# Patient Record
Sex: MALE | Race: WHITE | NOT HISPANIC OR LATINO
[De-identification: names, ages, dates, MRNs, and addresses within clinical notes are randomized per-mention and may not be internally consistent; named-entity substitution may affect disease eponyms.]

---

## 2017-03-26 ENCOUNTER — RECORDS - HEALTHEAST (OUTPATIENT)
Dept: ADMINISTRATIVE | Facility: OTHER | Age: 42
End: 2017-03-26

## 2017-03-27 ENCOUNTER — RECORDS - HEALTHEAST (OUTPATIENT)
Dept: ADMINISTRATIVE | Facility: OTHER | Age: 42
End: 2017-03-27

## 2017-04-19 ENCOUNTER — COMMUNICATION - HEALTHEAST (OUTPATIENT)
Dept: FAMILY MEDICINE | Facility: CLINIC | Age: 42
End: 2017-04-19

## 2017-04-19 DIAGNOSIS — G47.00 INSOMNIA: ICD-10-CM

## 2017-04-21 ENCOUNTER — COMMUNICATION - HEALTHEAST (OUTPATIENT)
Dept: FAMILY MEDICINE | Facility: CLINIC | Age: 42
End: 2017-04-21

## 2017-04-21 DIAGNOSIS — G47.00 INSOMNIA: ICD-10-CM

## 2017-07-17 ENCOUNTER — COMMUNICATION - HEALTHEAST (OUTPATIENT)
Dept: FAMILY MEDICINE | Facility: CLINIC | Age: 42
End: 2017-07-17

## 2017-07-17 DIAGNOSIS — F41.1 OTHER ANXIETY STATES: ICD-10-CM

## 2017-07-19 ENCOUNTER — COMMUNICATION - HEALTHEAST (OUTPATIENT)
Dept: FAMILY MEDICINE | Facility: CLINIC | Age: 42
End: 2017-07-19

## 2017-07-19 DIAGNOSIS — F41.1 OTHER ANXIETY STATES: ICD-10-CM

## 2017-11-07 ENCOUNTER — COMMUNICATION - HEALTHEAST (OUTPATIENT)
Dept: FAMILY MEDICINE | Facility: CLINIC | Age: 42
End: 2017-11-07

## 2017-11-07 DIAGNOSIS — G47.00 INSOMNIA: ICD-10-CM

## 2017-11-07 DIAGNOSIS — F41.9 ANXIETY: ICD-10-CM

## 2018-02-18 ENCOUNTER — COMMUNICATION - HEALTHEAST (OUTPATIENT)
Dept: FAMILY MEDICINE | Facility: CLINIC | Age: 43
End: 2018-02-18

## 2018-02-18 DIAGNOSIS — G47.00 INSOMNIA: ICD-10-CM

## 2018-02-18 DIAGNOSIS — F41.9 ANXIETY: ICD-10-CM

## 2018-05-21 ENCOUNTER — COMMUNICATION - HEALTHEAST (OUTPATIENT)
Dept: FAMILY MEDICINE | Facility: CLINIC | Age: 43
End: 2018-05-21

## 2018-05-21 DIAGNOSIS — F41.9 ANXIETY: ICD-10-CM

## 2018-06-21 ENCOUNTER — COMMUNICATION - HEALTHEAST (OUTPATIENT)
Dept: FAMILY MEDICINE | Facility: CLINIC | Age: 43
End: 2018-06-21

## 2018-06-21 DIAGNOSIS — F41.9 ANXIETY: ICD-10-CM

## 2018-07-06 ENCOUNTER — OFFICE VISIT - HEALTHEAST (OUTPATIENT)
Dept: FAMILY MEDICINE | Facility: CLINIC | Age: 43
End: 2018-07-06

## 2018-07-06 ENCOUNTER — COMMUNICATION - HEALTHEAST (OUTPATIENT)
Dept: TELEHEALTH | Facility: CLINIC | Age: 43
End: 2018-07-06

## 2018-07-06 DIAGNOSIS — G47.00 INSOMNIA: ICD-10-CM

## 2018-07-06 DIAGNOSIS — F41.9 ANXIETY: ICD-10-CM

## 2019-03-29 ENCOUNTER — OFFICE VISIT - HEALTHEAST (OUTPATIENT)
Dept: FAMILY MEDICINE | Facility: CLINIC | Age: 44
End: 2019-03-29

## 2019-03-29 ENCOUNTER — COMMUNICATION - HEALTHEAST (OUTPATIENT)
Dept: TELEHEALTH | Facility: CLINIC | Age: 44
End: 2019-03-29

## 2019-03-29 DIAGNOSIS — J02.9 SORE THROAT: ICD-10-CM

## 2019-03-29 DIAGNOSIS — J40 BRONCHITIS: ICD-10-CM

## 2019-03-29 DIAGNOSIS — R19.7 VOMITING AND DIARRHEA: ICD-10-CM

## 2019-03-29 DIAGNOSIS — H10.33 ACUTE BACTERIAL CONJUNCTIVITIS OF BOTH EYES: ICD-10-CM

## 2019-03-29 DIAGNOSIS — R52 GENERALIZED BODY ACHES: ICD-10-CM

## 2019-03-29 DIAGNOSIS — R11.10 VOMITING AND DIARRHEA: ICD-10-CM

## 2019-03-29 LAB
DEPRECATED S PYO AG THROAT QL EIA: NORMAL
FLUAV AG SPEC QL IA: NORMAL
FLUBV AG SPEC QL IA: NORMAL

## 2019-03-29 ASSESSMENT — MIFFLIN-ST. JEOR: SCORE: 2043.85

## 2019-03-30 LAB — GROUP A STREP BY PCR: NORMAL

## 2019-07-24 ENCOUNTER — COMMUNICATION - HEALTHEAST (OUTPATIENT)
Dept: FAMILY MEDICINE | Facility: CLINIC | Age: 44
End: 2019-07-24

## 2019-07-24 DIAGNOSIS — F41.9 ANXIETY: ICD-10-CM

## 2019-08-14 ENCOUNTER — COMMUNICATION - HEALTHEAST (OUTPATIENT)
Dept: FAMILY MEDICINE | Facility: CLINIC | Age: 44
End: 2019-08-14

## 2019-08-14 DIAGNOSIS — F41.9 ANXIETY: ICD-10-CM

## 2019-11-11 ENCOUNTER — OFFICE VISIT - HEALTHEAST (OUTPATIENT)
Dept: FAMILY MEDICINE | Facility: CLINIC | Age: 44
End: 2019-11-11

## 2019-11-11 DIAGNOSIS — J18.9 WALKING PNEUMONIA: ICD-10-CM

## 2019-11-11 DIAGNOSIS — J98.01 BRONCHOSPASM: ICD-10-CM

## 2019-11-16 ENCOUNTER — COMMUNICATION - HEALTHEAST (OUTPATIENT)
Dept: FAMILY MEDICINE | Facility: CLINIC | Age: 44
End: 2019-11-16

## 2019-11-16 DIAGNOSIS — F41.9 ANXIETY: ICD-10-CM

## 2019-11-17 ENCOUNTER — COMMUNICATION - HEALTHEAST (OUTPATIENT)
Dept: FAMILY MEDICINE | Facility: CLINIC | Age: 44
End: 2019-11-17

## 2019-11-17 DIAGNOSIS — G47.00 INSOMNIA: ICD-10-CM

## 2019-12-02 ENCOUNTER — OFFICE VISIT - HEALTHEAST (OUTPATIENT)
Dept: FAMILY MEDICINE | Facility: CLINIC | Age: 44
End: 2019-12-02

## 2019-12-02 DIAGNOSIS — G47.00 INSOMNIA, UNSPECIFIED TYPE: ICD-10-CM

## 2019-12-02 DIAGNOSIS — Z13.220 LIPID SCREENING: ICD-10-CM

## 2019-12-02 DIAGNOSIS — G47.00 INSOMNIA: ICD-10-CM

## 2019-12-02 DIAGNOSIS — R05.9 COUGH: ICD-10-CM

## 2019-12-02 DIAGNOSIS — M40.00 KYPHOSIS (ACQUIRED) (POSTURAL): ICD-10-CM

## 2019-12-02 DIAGNOSIS — F41.9 ANXIETY: ICD-10-CM

## 2019-12-02 DIAGNOSIS — R51.9 NONINTRACTABLE HEADACHE, UNSPECIFIED CHRONICITY PATTERN, UNSPECIFIED HEADACHE TYPE: ICD-10-CM

## 2019-12-02 DIAGNOSIS — K62.5 BRBPR (BRIGHT RED BLOOD PER RECTUM): ICD-10-CM

## 2019-12-02 DIAGNOSIS — Z00.00 WELL ADULT EXAM: ICD-10-CM

## 2019-12-02 LAB
ERYTHROCYTE [DISTWIDTH] IN BLOOD BY AUTOMATED COUNT: 12 % (ref 11–14.5)
HCT VFR BLD AUTO: 47 % (ref 40–54)
HGB BLD-MCNC: 16 G/DL (ref 14–18)
MCH RBC QN AUTO: 32.1 PG (ref 27–34)
MCHC RBC AUTO-ENTMCNC: 34.1 G/DL (ref 32–36)
MCV RBC AUTO: 94 FL (ref 80–100)
PLATELET # BLD AUTO: 250 THOU/UL (ref 140–440)
PMV BLD AUTO: 7.2 FL (ref 7–10)
RBC # BLD AUTO: 4.99 MILL/UL (ref 4.4–6.2)
WBC: 7.5 THOU/UL (ref 4–11)

## 2019-12-02 ASSESSMENT — MIFFLIN-ST. JEOR: SCORE: 2021.73

## 2019-12-03 LAB
ALBUMIN SERPL-MCNC: 4.2 G/DL (ref 3.5–5)
ALP SERPL-CCNC: 99 U/L (ref 45–120)
ALT SERPL W P-5'-P-CCNC: 53 U/L (ref 0–45)
ANION GAP SERPL CALCULATED.3IONS-SCNC: 8 MMOL/L (ref 5–18)
AST SERPL W P-5'-P-CCNC: 35 U/L (ref 0–40)
BILIRUB DIRECT SERPL-MCNC: 0.2 MG/DL
BILIRUB SERPL-MCNC: 0.8 MG/DL (ref 0–1)
BUN SERPL-MCNC: 10 MG/DL (ref 8–22)
CALCIUM SERPL-MCNC: 9.6 MG/DL (ref 8.5–10.5)
CHLORIDE BLD-SCNC: 105 MMOL/L (ref 98–107)
CHOLEST SERPL-MCNC: 274 MG/DL
CO2 SERPL-SCNC: 24 MMOL/L (ref 22–31)
CREAT SERPL-MCNC: 0.94 MG/DL (ref 0.7–1.3)
FASTING STATUS PATIENT QL REPORTED: YES
GFR SERPL CREATININE-BSD FRML MDRD: >60 ML/MIN/1.73M2
GLUCOSE BLD-MCNC: 86 MG/DL (ref 70–125)
HDLC SERPL-MCNC: 39 MG/DL
LDLC SERPL CALC-MCNC: 188 MG/DL
POTASSIUM BLD-SCNC: 4 MMOL/L (ref 3.5–5)
PROT SERPL-MCNC: 7.5 G/DL (ref 6–8)
SODIUM SERPL-SCNC: 137 MMOL/L (ref 136–145)
TRIGL SERPL-MCNC: 234 MG/DL

## 2019-12-04 ENCOUNTER — COMMUNICATION - HEALTHEAST (OUTPATIENT)
Dept: FAMILY MEDICINE | Facility: CLINIC | Age: 44
End: 2019-12-04

## 2020-02-07 ENCOUNTER — RECORDS - HEALTHEAST (OUTPATIENT)
Dept: ADMINISTRATIVE | Facility: OTHER | Age: 45
End: 2020-02-07

## 2020-02-12 ENCOUNTER — AMBULATORY - HEALTHEAST (OUTPATIENT)
Dept: NURSING | Facility: CLINIC | Age: 45
End: 2020-02-12

## 2020-02-12 DIAGNOSIS — Z00.00 HEALTHCARE MAINTENANCE: ICD-10-CM

## 2020-02-27 ENCOUNTER — COMMUNICATION - HEALTHEAST (OUTPATIENT)
Dept: FAMILY MEDICINE | Facility: CLINIC | Age: 45
End: 2020-02-27

## 2020-02-27 DIAGNOSIS — F41.9 ANXIETY: ICD-10-CM

## 2020-05-27 ENCOUNTER — COMMUNICATION - HEALTHEAST (OUTPATIENT)
Dept: FAMILY MEDICINE | Facility: CLINIC | Age: 45
End: 2020-05-27

## 2020-05-27 DIAGNOSIS — G47.00 INSOMNIA: ICD-10-CM

## 2020-08-18 ENCOUNTER — COMMUNICATION - HEALTHEAST (OUTPATIENT)
Dept: FAMILY MEDICINE | Facility: CLINIC | Age: 45
End: 2020-08-18

## 2020-08-18 DIAGNOSIS — G47.00 INSOMNIA: ICD-10-CM

## 2020-08-18 DIAGNOSIS — F41.9 ANXIETY: ICD-10-CM

## 2020-11-16 ENCOUNTER — COMMUNICATION - HEALTHEAST (OUTPATIENT)
Dept: FAMILY MEDICINE | Facility: CLINIC | Age: 45
End: 2020-11-16

## 2020-11-16 DIAGNOSIS — G47.00 INSOMNIA: ICD-10-CM

## 2020-11-16 DIAGNOSIS — F41.9 ANXIETY: ICD-10-CM

## 2021-02-16 ENCOUNTER — COMMUNICATION - HEALTHEAST (OUTPATIENT)
Dept: FAMILY MEDICINE | Facility: CLINIC | Age: 46
End: 2021-02-16

## 2021-02-16 ENCOUNTER — AMBULATORY - HEALTHEAST (OUTPATIENT)
Dept: NURSING | Facility: CLINIC | Age: 46
End: 2021-02-16

## 2021-02-16 DIAGNOSIS — F41.9 ANXIETY: ICD-10-CM

## 2021-02-16 DIAGNOSIS — G47.00 INSOMNIA: ICD-10-CM

## 2021-02-16 DIAGNOSIS — Z23 NEED FOR INFLUENZA VACCINATION: ICD-10-CM

## 2021-03-23 ENCOUNTER — COMMUNICATION - HEALTHEAST (OUTPATIENT)
Dept: FAMILY MEDICINE | Facility: CLINIC | Age: 46
End: 2021-03-23

## 2021-03-23 DIAGNOSIS — G47.00 INSOMNIA: ICD-10-CM

## 2021-03-23 DIAGNOSIS — F41.9 ANXIETY: ICD-10-CM

## 2021-04-02 ENCOUNTER — OFFICE VISIT - HEALTHEAST (OUTPATIENT)
Dept: FAMILY MEDICINE | Facility: CLINIC | Age: 46
End: 2021-04-02

## 2021-04-02 DIAGNOSIS — Z00.00 ANNUAL PHYSICAL EXAM: ICD-10-CM

## 2021-04-02 DIAGNOSIS — Z00.00 HEALTHCARE MAINTENANCE: ICD-10-CM

## 2021-04-02 DIAGNOSIS — F41.9 ANXIETY: ICD-10-CM

## 2021-04-02 DIAGNOSIS — E78.2 MIXED HYPERLIPIDEMIA: ICD-10-CM

## 2021-04-02 DIAGNOSIS — Z13.1 SCREENING FOR DIABETES MELLITUS: ICD-10-CM

## 2021-04-02 DIAGNOSIS — G47.00 INSOMNIA: ICD-10-CM

## 2021-04-02 LAB
CHOLEST SERPL-MCNC: 212 MG/DL
FASTING STATUS PATIENT QL REPORTED: NO
FASTING STATUS PATIENT QL REPORTED: NO
GLUCOSE BLD-MCNC: 77 MG/DL (ref 74–125)
HDLC SERPL-MCNC: 33 MG/DL
LDLC SERPL CALC-MCNC: 156 MG/DL
TRIGL SERPL-MCNC: 115 MG/DL

## 2021-04-02 RX ORDER — TRAZODONE HYDROCHLORIDE 50 MG/1
TABLET, FILM COATED ORAL
Qty: 180 TABLET | Refills: 3 | Status: SHIPPED | OUTPATIENT
Start: 2021-04-02 | End: 2022-03-07

## 2021-04-02 RX ORDER — ESCITALOPRAM OXALATE 5 MG/1
5 TABLET ORAL DAILY
Qty: 90 TABLET | Refills: 3 | Status: SHIPPED | OUTPATIENT
Start: 2021-04-02 | End: 2022-04-20

## 2021-04-02 ASSESSMENT — ANXIETY QUESTIONNAIRES
5. BEING SO RESTLESS THAT IT IS HARD TO SIT STILL: SEVERAL DAYS
3. WORRYING TOO MUCH ABOUT DIFFERENT THINGS: SEVERAL DAYS
6. BECOMING EASILY ANNOYED OR IRRITABLE: SEVERAL DAYS
7. FEELING AFRAID AS IF SOMETHING AWFUL MIGHT HAPPEN: NOT AT ALL
2. NOT BEING ABLE TO STOP OR CONTROL WORRYING: NOT AT ALL
IF YOU CHECKED OFF ANY PROBLEMS ON THIS QUESTIONNAIRE, HOW DIFFICULT HAVE THESE PROBLEMS MADE IT FOR YOU TO DO YOUR WORK, TAKE CARE OF THINGS AT HOME, OR GET ALONG WITH OTHER PEOPLE: NOT DIFFICULT AT ALL
1. FEELING NERVOUS, ANXIOUS, OR ON EDGE: SEVERAL DAYS
4. TROUBLE RELAXING: SEVERAL DAYS
GAD7 TOTAL SCORE: 5

## 2021-04-02 ASSESSMENT — PATIENT HEALTH QUESTIONNAIRE - PHQ9: SUM OF ALL RESPONSES TO PHQ QUESTIONS 1-9: 4

## 2021-04-02 ASSESSMENT — MIFFLIN-ST. JEOR: SCORE: 2008.69

## 2021-04-04 ENCOUNTER — COMMUNICATION - HEALTHEAST (OUTPATIENT)
Dept: FAMILY MEDICINE | Facility: CLINIC | Age: 46
End: 2021-04-04

## 2021-04-04 LAB
ABO/RH(D): NORMAL
ABORH REPEAT: NORMAL

## 2021-04-27 ENCOUNTER — COMMUNICATION - HEALTHEAST (OUTPATIENT)
Dept: FAMILY MEDICINE | Facility: CLINIC | Age: 46
End: 2021-04-27

## 2021-04-27 DIAGNOSIS — G47.00 INSOMNIA: ICD-10-CM

## 2021-04-27 DIAGNOSIS — F41.9 ANXIETY: ICD-10-CM

## 2021-05-27 ASSESSMENT — PATIENT HEALTH QUESTIONNAIRE - PHQ9: SUM OF ALL RESPONSES TO PHQ QUESTIONS 1-9: 4

## 2021-05-27 NOTE — PROGRESS NOTES
Chief Complaint   Patient presents with     Cough     Symptoms started 3 weeks ago.     Sore Throat     Eye Color Change     Eyes got red since about Tuesday.      Emesis     This happened last week.     Diarrhea     Fever     HPI: Patient presents today with 3 weeks of illness, primarily chills, fever, body aches, sore throat, and a productive cough bringing up thick brown mucus.  2 weeks ago he also had vomiting and diarrhea, but that resolved spontaneously.  No abdominal pain now.  His chest aches with the cough and notes a little bit of tenderness along his tonsillar and submandibular lymph nodes.  He had a 5-year-old at home with similar symptoms who was able to recover spontaneously much quicker than this.  He notes a little bit of sinus congestion that is typically worse in the mornings.  He also notes that a few days ago he started to get goopy discharge and matting in his right eye that is now also transferred over to his left eye.  No loss of vision, but it does feel a little scratchy.  Atraumatic.    ROS:Review of Systems - History obtained from the patient  General ROS: positive for  - chills, fatigue and fever  Ophthalmic ROS: positive for -matting  ENT ROS: positive for - hearing change and sore throat  Allergy and Immunology ROS: positive for - itchy/watery eyes, nasal congestion and postnasal drip  Hematological and Lymphatic ROS: positive for - swollen lymph nodes  Respiratory ROS: positive for - cough  Cardiovascular ROS: negative  Musculoskeletal ROS: positive for - joint pain and joint stiffness    SH: The Patient's  reports that he quit smoking about 15 years ago. He has quit using smokeless tobacco.      FH: The Patient's family history includes Arthritis in his mother; Carotid Endarterectomy in his mother; Heart attack in his mother.     Meds:    Current Outpatient Medications on File Prior to Visit   Medication Sig Dispense Refill     citalopram (CELEXA) 20 MG tablet Take 1 tablet (20 mg  total) by mouth daily. 90 tablet 3     ranitidine (ZANTAC) 150 MG capsule Take 150 mg by mouth.       traZODone (DESYREL) 50 MG tablet Take 1-3 tablets ( mg total) by mouth at bedtime. 270 tablet 2     No current facility-administered medications on file prior to visit.        O:  /76   Pulse (!) 53   Temp 98  F (36.7  C) (Oral)   Ht 6' (1.829 m)   Wt (!) 246 lb (111.6 kg)   SpO2 97%   BMI 33.36 kg/m      Physical Examination:   General appearance - alert, well appearing, and in no distress  Mental status - alert, oriented to person, place, and time  Eyes -conjunctiva injected.  No overt thick drainage noted today.  Pupils equal and reactive, extraocular eye movements intact  Ears - bilateral TM's and external ear canals normal  Nose - normal and patent, no erythema, discharge or polyps  Mouth - mucous membranes moist, pharynx normal without lesions  Neck - supple, no significant adenopathy  Lymphatics - no palpable lymphadenopathy, no hepatosplenomegaly  Chest - clear to auscultation, no wheezes, rales or rhonchi, symmetric air entry  Heart - normal rate and regular rhythm, S1 and S2 normal, no murmurs noted  Extremities - peripheral pulses normal, no pedal edema, no clubbing or cyanosis  Skin - normal coloration and turgor, no rashes, no suspicious skin lesions noted      A/P:     Problem List Items Addressed This Visit     None      Visit Diagnoses     Generalized body aches    -  Primary    Relevant Orders    Influenza A/B Rapid Test (Completed)    Acute bacterial conjunctivitis of both eyes        Relevant Medications    tobramycin (TOBREX) 0.3 % ophthalmic solution    azithromycin (ZITHROMAX Z-JOSE) 250 MG tablet    Sore throat        Relevant Orders    Rapid Strep A Screen-Throat (Completed)    Group A Strep, RNA Direct Detection, Throat (Completed)    Vomiting and diarrhea        Bronchitis        Relevant Medications    azithromycin (ZITHROMAX Z-JOSE) 250 MG tablet            1. Acute  bacterial conjunctivitis of both eyes  Prescription for tobramycin drops into the pharmacy.  Reviewed how to use these.  Avoid touching the eye with the bottle.  Encouraged hand hygiene    - tobramycin (TOBREX) 0.3 % ophthalmic solution; Administer 1 drop to both eyes every 4 (four) hours for 10 days.  Dispense: 5 mL; Refill: 0    2. Generalized body aches  Negative flu.  - Influenza A/B Rapid Test    3. Sore throat  Negative strep.  - Rapid Strep A Screen-Throat  - Group A Strep, RNA Direct Detection, Throat    4. Vomiting and diarrhea  Most likely viral illness.  Doing well from a GI standpoint.    5. Bronchitis  Symptoms persisting for greater than 3 weeks now.  We will do a trial of azithromycin for bronchitis.  Encouraged symptom management with over-the-counter measures as well.    - azithromycin (ZITHROMAX Z-JOSE) 250 MG tablet; Take 2 tablets (500 mg) on  Day 1,  followed by 1 tablet (250 mg) once daily on Days 2 through 5.  Dispense: 6 tablet; Refill: 0        Alfred Guillaume, CNP

## 2021-05-28 ASSESSMENT — ANXIETY QUESTIONNAIRES: GAD7 TOTAL SCORE: 5

## 2021-05-30 NOTE — TELEPHONE ENCOUNTER
Refill Given    Refill given per Policy, patient informed they are overdue for Labs and Physical     Alessandra Tan, [facility] Triage/Med Refill 7/24/2019    Requested Prescriptions   Pending Prescriptions Disp Refills     citalopram (CELEXA) 20 MG tablet 90 tablet 3     Sig: Take 1 tablet (20 mg total) by mouth daily.       [facility] Refill Protocol  Failed - 7/24/2019 10:43 AM        Failed - PCP or prescribing provider visit in last year     Last office visit with prescriber/PCP: 7/6/2018 Glo Hanna MD OR same dept: Visit date not found OR same specialty: 3/29/2019 Alfred Guillaume CNP  Last physical: Visit date not found Last MTM visit: Visit date not found   Next visit within 3 mo: Visit date not found  Next physical within 3 mo: Visit date not found  Prescriber OR PCP: Glo Hanna MD  Last diagnosis associated with med order: 1. Anxiety  - citalopram (CELEXA) 20 MG tablet; Take 1 tablet (20 mg total) by mouth daily.  Dispense: 90 tablet; Refill: 3    If protocol passes may refill for 12 months if within 3 months of last provider visit (or a total of 15 months).

## 2021-05-31 NOTE — TELEPHONE ENCOUNTER
RN cannot approve Refill Request    RN can NOT refill this medication PCP messaged that patient is overdue for Office Visit. Last office visit: 7/6/2018 Glo Hanna MD Last Physical: Visit date not found Last MTM visit: Visit date not found Last visit same specialty: 3/29/2019 Alfred Guillaume CNP.  Next visit within 3 mo: Visit date not found  Next physical within 3 mo: Visit date not found      Flori Arreola, [facility] Triage/Med Refill 8/15/2019    Requested Prescriptions   Pending Prescriptions Disp Refills     citalopram (CELEXA) 20 MG tablet [Pharmacy Med Name: CITALOPRAM 20MG TABLETS] 90 tablet 0     Sig: TAKE 1 TABLET BY MOUTH DAILY. APPOINTMENT REQUIRED FOR FUTURE REFILLS.       [facility] Refill Protocol  Failed - 8/14/2019  6:58 PM        Failed - PCP or prescribing provider visit in last year     Last office visit with prescriber/PCP: 7/6/2018 Glo Hanna MD OR same dept: Visit date not found OR same specialty: 3/29/2019 Alfred Guillaume CNP  Last physical: Visit date not found Last MTM visit: Visit date not found   Next visit within 3 mo: Visit date not found  Next physical within 3 mo: Visit date not found  Prescriber OR PCP: Glo Hanna MD  Last diagnosis associated with med order: 1. Anxiety  - citalopram (CELEXA) 20 MG tablet [Pharmacy Med Name: CITALOPRAM 20MG TABLETS]; TAKE 1 TABLET BY MOUTH DAILY. APPOINTMENT REQUIRED FOR FUTURE REFILLS.  Dispense: 90 tablet; Refill: 0    If protocol passes may refill for 12 months if within 3 months of last provider visit (or a total of 15 months).

## 2021-06-01 VITALS — BODY MASS INDEX: 31.47 KG/M2 | WEIGHT: 235.3 LBS

## 2021-06-02 VITALS — BODY MASS INDEX: 33.32 KG/M2 | WEIGHT: 246 LBS | HEIGHT: 72 IN

## 2021-06-03 VITALS
DIASTOLIC BLOOD PRESSURE: 80 MMHG | BODY MASS INDEX: 32.78 KG/M2 | SYSTOLIC BLOOD PRESSURE: 122 MMHG | WEIGHT: 242 LBS | HEIGHT: 72 IN | HEART RATE: 62 BPM | TEMPERATURE: 97.8 F

## 2021-06-03 VITALS
BODY MASS INDEX: 33.77 KG/M2 | SYSTOLIC BLOOD PRESSURE: 122 MMHG | RESPIRATION RATE: 18 BRPM | HEART RATE: 55 BPM | WEIGHT: 249 LBS | DIASTOLIC BLOOD PRESSURE: 78 MMHG | TEMPERATURE: 98 F | OXYGEN SATURATION: 96 %

## 2021-06-03 NOTE — TELEPHONE ENCOUNTER
Patient Returning Call  Reason for call:  LMTCB     Information relayed to patient:  Relayed msg and patient agrees w/ Next Best course of action. Patient was transferred to scheduling to make 6 week fu apt as advised no further questions at this time.     Patient has additional questions:  No   If YES, what are your questions/concerns: Na  Okay to leave a detailed message?: No call back needed

## 2021-06-03 NOTE — TELEPHONE ENCOUNTER
----- Message from Teddy Virk MD sent at 12/4/2019  7:21 AM CST -----  Kidney and liver function tests normal except for 1 borderline liver function test.  Lipids are elevated as above.  Recommend low-fat low-cholesterol diet and recheck fasting lipid profile on your follow-up in 6 weeks to determine whether or not medication is needed.

## 2021-06-03 NOTE — PROGRESS NOTES
Assessment:  1.  Walking pneumonia.  2.  Acute bronchospasm.    Plan: Prescribed azithromycin to 50 mg-#6-2 p.o. today then 1 p.o. on days 2 through 5.  Prescribed prednisone 20 mg-#15-take 2 p.o. daily for the first 5 days then 1 p.o. daily for 5 days then discontinue.  Prescribed albuterol inhaler-2 puffs every 4 hours as needed for wheezing.  Warned him about possible insomnia or anxiety or irritated stomach from the prednisone.  Asked him to call if any difficulties with any of the above.  Recommend he follow-up in 2 to 3 weeks for recheck and can schedule that also as an overall physical but call return earlier if not improving well.    Subjective: 44-year-old male who presents for evaluation of illness that started 1 month ago with cough and fever.  He notes that his son had it in his 5 years old and seem to bring at home from school.  He has gotten cleared up and his wife had gotten the infection and also cleared up.  But he has continued having coughing.  He still has low-grade fevers off and on to 100.  Occasionally he coughs hard enough that he has vomiting occur.  He has no prior history of asthma.  He is generally healthy otherwise.  He has not had a physical in many years.  Past Medical History:   Diagnosis Date     Anxiety 12/17/2014     Insomnia 1/8/2015     Open wound     Created by Conversion  Replacement Utility updated for latest IMO load     Allergies   Allergen Reactions     Amoxicillin      Penicillins      Current Outpatient Medications on File Prior to Visit   Medication Sig Dispense Refill     citalopram (CELEXA) 20 MG tablet TAKE 1 TABLET BY MOUTH DAILY. APPOINTMENT REQUIRED FOR FUTURE REFILLS. 90 tablet 0     ranitidine (ZANTAC) 150 MG capsule Take 150 mg by mouth.       traZODone (DESYREL) 50 MG tablet Take 1-3 tablets ( mg total) by mouth at bedtime. 270 tablet 2     No current facility-administered medications on file prior to visit.      He is not currently taking the ranitidine  noting that it is hard to find it over-the-counter.  All other review of systems are currently negative.    Objective:/78   Pulse (!) 55   Temp 98  F (36.7  C) (Oral)   Resp 18   Wt (!) 249 lb (112.9 kg)   SpO2 96%   BMI 33.77 kg/m    Head normocephalic.  External ears and TMs are normal.  Eyes unremarkable.  Minimal nasal congestion.  Oropharynx negative.  Neck supple without adenopathy or thyromegaly.  Lungs show scattered bilateral rales as well as expiratory rhonchi and wheezes.  No respiratory distress.  Heart regular rate and rhythm without murmur.  Abdomen shows no masses tenderness or hepatosplenomegaly.  No pedal edema.  Skin is not remarkable.  He is alert with clear speech.

## 2021-06-03 NOTE — TELEPHONE ENCOUNTER
RN cannot approve Refill Request    RN can NOT refill this medication Protocol failed and NO refill given.       Trini Pepe, [facility] Triage/Med Refill 11/17/2019    Requested Prescriptions   Pending Prescriptions Disp Refills     traZODone (DESYREL) 50 MG tablet [Pharmacy Med Name: TRAZODONE 50MG TABLETS] 270 tablet 0     Sig: TAKE 1 TO 3 TABLETS(50  MG) BY MOUTH AT BEDTIME       Tricyclics/Misc Antidepressant/Antianxiety Meds Refill Protocol Failed - 11/17/2019  8:01 AM        Failed - PCP or prescribing provider visit in last year     Last office visit with prescriber/PCP: 7/6/2018 Glo Hanna MD OR same dept: Visit date not found OR same specialty: 11/11/2019 Teddy Virk MD  Last physical: Visit date not found Last MTM visit: Visit date not found   Next visit within 3 mo: Visit date not found  Next physical within 3 mo: Visit date not found  Prescriber OR PCP: Glo Hanna MD  Last diagnosis associated with med order: 1. Insomnia  - traZODone (DESYREL) 50 MG tablet [Pharmacy Med Name: TRAZODONE 50MG TABLETS]; TAKE 1 TO 3 TABLETS(50  MG) BY MOUTH AT BEDTIME  Dispense: 270 tablet; Refill: 0    If protocol passes may refill for 12 months if within 3 months of last provider visit (or a total of 15 months).

## 2021-06-03 NOTE — TELEPHONE ENCOUNTER
RN cannot approve Refill Request    RN can NOT refill this medication Protocol failed and NO refill given.       Martha Mathews, [facility] Triage/Med Refill 11/17/2019    Requested Prescriptions   Pending Prescriptions Disp Refills     citalopram (CELEXA) 20 MG tablet [Pharmacy Med Name: CITALOPRAM 20MG TABLETS] 90 tablet 3     Sig: TAKE 1 TABLET BY MOUTH DAILY       [facility] Refill Protocol  Failed - 11/16/2019  9:16 PM        Failed - PCP or prescribing provider visit in last year     Last office visit with prescriber/PCP: 7/6/2018 Glo Hanna MD OR same dept: Visit date not found OR same specialty: 11/11/2019 Teddy Virk MD  Last physical: Visit date not found Last MTM visit: Visit date not found   Next visit within 3 mo: Visit date not found  Next physical within 3 mo: Visit date not found  Prescriber OR PCP: Glo Hanna MD  Last diagnosis associated with med order: 1. Anxiety  - citalopram (CELEXA) 20 MG tablet [Pharmacy Med Name: CITALOPRAM 20MG TABLETS]; TAKE 1 TABLET BY MOUTH DAILY  Dispense: 90 tablet; Refill: 0    If protocol passes may refill for 12 months if within 3 months of last provider visit (or a total of 15 months).

## 2021-06-03 NOTE — PROGRESS NOTES
Assessment:      Healthy male exam.    1. Well adult exam     2. Cough  XR Chest 2 Views    HM2(CBC w/o Differential)   3. Nonintractable headache, unspecified chronicity pattern, unspecified headache type  Basic Metabolic Panel    Hepatic Profile    HM2(CBC w/o Differential)   4. Anxiety  Basic Metabolic Panel    Hepatic Profile    HM2(CBC w/o Differential)    escitalopram oxalate (LEXAPRO) 5 MG tablet   5. Insomnia, unspecified type     6. Lipid screening  Lipid Cascade   7. Insomnia  traZODone (DESYREL) 50 MG tablet   8. BRBPR (bright red blood per rectum)  Ambulatory referral for Colonoscopy   9. Kyphosis (acquired) (postural)            Plan:       Explained chest x-ray did not see current pneumonia. Explained that I think his cough is residual inflammation from the earlier infection.  He can use the albuterol as needed and then follow-up in 6 weeks because he needs follow-up for the other issues.  Call return earlier if he is not having the cough gradually improved.  Prescribed the trazodone for his insomnia as he is needed he is chronically.  But then recommend that he stop using citalopram because of the potential interaction with trazodone.  We will try use of lower dose of S-Citalopram at 5 mg and see how that works regarding his anxiety issue.  Refer to GI for colonoscopy because of the persisting bright red blood per rectum that he is had.  Explained he did not have any anemia.  We will contact him regarding the lipid basic and hepatic profiles.  Explained it would be possible for the citalopram to be connected with the headache and so we will see if his headache gets better or not and also has the coughing gets better that should get better.  But return to clinic sooner if any worsening or lack of improvement.    Recommend that he try to use careful posture and sit at the street as he can to protect his back from further kyphosis.  Showed him the x-ray.    Subjective:      Gael Mcclain is a 44 y.o.  male who presents for an annual exam. The patient reports that there is not domestic violence in his life.  See recent note regarding his walking pneumonia.  He did finish the azithromycin and steroid.  He feels that he is about 60% better but certainly not gone.  He states that he is really not having any fever.  Although cough is better he certainly is still coughing.  But not bringing up phlegm.  His wife wanted him to note that he is having daily headaches, about 5 days out of the week, they come on during the late morning or afternoon.  He does have some acid reflux.  He is not on ranitidine because of that not being available over-the-counter.  He does snore a lot per his wife but is only been with his recent illness that he has had times where he seemed to stop breathing and that was not the case beforehand.  And he has had intermittent bright red blood per rectum especially when he wipes after a bowel movement but that has been on a frequent basis now for several years since he had a previous Campylobacter infection.  He is not having any diarrhea.    Healthy Habits:   Regular Exercise: No  Sunscreen Use: Yes  Healthy Diet: Yes  Dental Visits Regularly: Yes  Seat Belt: Yes  Sexually active: Yes  Monthly Self Testicular Exams:  No  Hemoccults: No  Flex Sig: No  Colonoscopy: No  Lipid Profile: Yes  Glucose Screen: Yes  Prevention of Osteoporosis: No  Last Dexa: No  Guns at Home:  Yes  Gun safe/class:  Yes      Immunization History   Administered Date(s) Administered     Influenza, inj, historic,unspecified 11/01/2014, 09/07/2016     Tdap 03/04/2013     Immunization status: Is not up-to-date but did not wish to do immunizations today given the above issues..    No exam data present     Current Outpatient Medications   Medication Sig Dispense Refill     albuterol (PROAIR HFA;PROVENTIL HFA;VENTOLIN HFA) 90 mcg/actuation inhaler Inhale 2 puffs every 4 (four) hours as needed for wheezing. 1 each 0     traZODone  (DESYREL) 50 MG tablet Take 2 tablets (100 mg total) by mouth at bedtime. 180 tablet 1     escitalopram oxalate (LEXAPRO) 5 MG tablet Take 1 tablet (5 mg total) by mouth daily. 90 tablet 0     No current facility-administered medications for this visit.      Past Medical History:   Diagnosis Date     Anxiety 12/17/2014     Insomnia 1/8/2015     Open wound     Created by Conversion  Replacement Utility updated for latest IMO load     Past Surgical History:   Procedure Laterality Date     LUMBAR DISCECTOMY  2009    L5-S1     Amoxicillin and Penicillins  Family History   Problem Relation Age of Onset     Heart attack Mother         With Stents     Carotid Endarterectomy Mother      Arthritis Mother      Social History     Socioeconomic History     Marital status:      Spouse name: Not on file     Number of children: Not on file     Years of education: Not on file     Highest education level: Not on file   Occupational History     Not on file   Social Needs     Financial resource strain: Not on file     Food insecurity:     Worry: Not on file     Inability: Not on file     Transportation needs:     Medical: Not on file     Non-medical: Not on file   Tobacco Use     Smoking status: Former Smoker     Packs/day: 0.00     Last attempt to quit: 1/1/2004     Years since quitting: 15.9     Smokeless tobacco: Former User   Substance and Sexual Activity     Alcohol use: Not Currently     Drug use: Never     Sexual activity: Not on file   Lifestyle     Physical activity:     Days per week: Not on file     Minutes per session: Not on file     Stress: Not on file   Relationships     Social connections:     Talks on phone: Not on file     Gets together: Not on file     Attends Scientologist service: Not on file     Active member of club or organization: Not on file     Attends meetings of clubs or organizations: Not on file     Relationship status: Not on file     Intimate partner violence:     Fear of current or ex partner: Not  "on file     Emotionally abused: Not on file     Physically abused: Not on file     Forced sexual activity: Not on file   Other Topics Concern     Not on file   Social History Narrative     Not on file       Review of Systems  Review of Systems   All other review of systems are negative.          Objective:     Vitals:    12/02/19 1615   BP: 122/80   Pulse: 62   Temp: 97.8  F (36.6  C)   TempSrc: Oral   Weight: (!) 242 lb (109.8 kg)   Height: 5' 11.75\" (1.822 m)     Body mass index is 33.05 kg/m .    Physical  Physical Exam   Alert male.  Head normocephalic.  External ears and TMs normal.  Eyes show pupils equal round and react to light and accommodation.  Nose and oropharynx are negative.  Neck supple without adenopathy or thyromegaly.  Lungs are clear to auscultation with no wheezes rales or rhonchi heard at this moment.  Heart regular rate and rhythm without murmur.  Abdomen shows no masses tenderness or hepatosplenomegaly.  Genitalia normal normal testes.  No hernia.  Rectal examination unremarkable smooth prostate.  No pedal edema.  No significant skin lesions seen.    Chest x-ray as read by radiology showed a few scattered strands of fibrosis or atelectasis but no infiltrate or mass.  He did have some anterior wedging of mid thoracic vertebral bodies consistent with some kyphosis.           "

## 2021-06-05 VITALS
WEIGHT: 240 LBS | HEART RATE: 50 BPM | OXYGEN SATURATION: 98 % | HEIGHT: 72 IN | DIASTOLIC BLOOD PRESSURE: 72 MMHG | SYSTOLIC BLOOD PRESSURE: 112 MMHG | BODY MASS INDEX: 32.51 KG/M2

## 2021-06-06 NOTE — TELEPHONE ENCOUNTER
Refill Approved    Rx renewed per Medication Renewal Policy. Medication was last renewed on 12/2/19.    Martha Mathews, [facility] Triage/Med Refill 2/28/2020     Requested Prescriptions   Pending Prescriptions Disp Refills     escitalopram oxalate (LEXAPRO) 5 MG tablet [Pharmacy Med Name: ESCITALOPRAM 5MG TABLETS] 90 tablet 0     Sig: TAKE 1 TABLET(5 MG) BY MOUTH DAILY       [facility] Refill Protocol  Passed - 2/27/2020  5:38 PM        Passed - PCP or prescribing provider visit in last year     Last office visit with prescriber/PCP: 11/11/2019 Teddy Virk MD OR same dept: 11/11/2019 Teddy Virk MD OR same specialty: 11/11/2019 Teddy Virk MD  Last physical: 12/2/2019 Last MTM visit: Visit date not found   Next visit within 3 mo: Visit date not found  Next physical within 3 mo: Visit date not found  Prescriber OR PCP: Teddy Virk MD  Last diagnosis associated with med order: 1. Anxiety  - escitalopram oxalate (LEXAPRO) 5 MG tablet [Pharmacy Med Name: ESCITALOPRAM 5MG TABLETS]; TAKE 1 TABLET(5 MG) BY MOUTH DAILY  Dispense: 90 tablet; Refill: 0    If protocol passes may refill for 12 months if within 3 months of last provider visit (or a total of 15 months).

## 2021-06-08 NOTE — TELEPHONE ENCOUNTER
Refill Approved    Rx renewed per Medication Renewal Policy. Medication was last renewed on 12/2/19.    Martha Mathews, [facility] Triage/Med Refill 6/1/2020     Requested Prescriptions   Pending Prescriptions Disp Refills     traZODone (DESYREL) 50 MG tablet [Pharmacy Med Name: TRAZODONE 50MG TABLETS] 180 tablet 1     Sig: TAKE 2 TABLETS(100 MG) BY MOUTH AT BEDTIME       Tricyclics/Misc Antidepressant/Antianxiety Meds Refill Protocol Passed - 5/27/2020 11:51 AM        Passed - PCP or prescribing provider visit in last year     Last office visit with prescriber/PCP: 11/11/2019 Teddy Virk MD OR same dept: 11/11/2019 Teddy Virk MD OR same specialty: 11/11/2019 Teddy Virk MD  Last physical: 12/2/2019 Last MTM visit: Visit date not found   Next visit within 3 mo: Visit date not found  Next physical within 3 mo: Visit date not found  Prescriber OR PCP: Teddy Virk MD  Last diagnosis associated with med order: 1. Insomnia  - traZODone (DESYREL) 50 MG tablet [Pharmacy Med Name: TRAZODONE 50MG TABLETS]; TAKE 2 TABLETS(100 MG) BY MOUTH AT BEDTIME  Dispense: 180 tablet; Refill: 1    If protocol passes may refill for 12 months if within 3 months of last provider visit (or a total of 15 months).

## 2021-06-10 NOTE — TELEPHONE ENCOUNTER
Refill Approved    Rx renewed per Medication Renewal Policy. Medication was last renewed on 2/28/20.6/2/20.    Martha Mathews, [facility] Triage/Med Refill 8/20/2020     Requested Prescriptions   Pending Prescriptions Disp Refills     escitalopram oxalate (LEXAPRO) 5 MG tablet [Pharmacy Med Name: ESCITALOPRAM 5MG TABLETS] 90 tablet 2     Sig: TAKE 1 TABLET(5 MG) BY MOUTH DAILY       [facility] Refill Protocol  Passed - 8/18/2020  8:36 PM        Passed - PCP or prescribing provider visit in last year     Last office visit with prescriber/PCP: 11/11/2019 Teddy Virk MD OR same dept: 11/11/2019 Teddy Virk MD OR same specialty: 11/11/2019 Teddy Virk MD  Last physical: 12/2/2019 Last MTM visit: Visit date not found   Next visit within 3 mo: Visit date not found  Next physical within 3 mo: Visit date not found  Prescriber OR PCP: Teddy Virk MD  Last diagnosis associated with med order: 1. Anxiety  - escitalopram oxalate (LEXAPRO) 5 MG tablet [Pharmacy Med Name: ESCITALOPRAM 5MG TABLETS]; TAKE 1 TABLET(5 MG) BY MOUTH DAILY  Dispense: 90 tablet; Refill: 2    2. Insomnia  - traZODone (DESYREL) 50 MG tablet [Pharmacy Med Name: TRAZODONE 50MG TABLETS]; TAKE 2 TABLETS(100 MG) BY MOUTH AT BEDTIME  Dispense: 180 tablet; Refill: 1    If protocol passes may refill for 12 months if within 3 months of last provider visit (or a total of 15 months).                traZODone (DESYREL) 50 MG tablet [Pharmacy Med Name: TRAZODONE 50MG TABLETS] 180 tablet 1     Sig: TAKE 2 TABLETS(100 MG) BY MOUTH AT BEDTIME       Tricyclics/Misc Antidepressant/Antianxiety Meds Refill Protocol Passed - 8/18/2020  8:36 PM        Passed - PCP or prescribing provider visit in last year     Last office visit with prescriber/PCP: 11/11/2019 Teddy Virk MD OR same dept: 11/11/2019 Teddy Virk MD OR same specialty: 11/11/2019 Teddy Virk MD  Last physical: 12/2/2019 Last MTM visit: Visit date not found   Next visit  within 3 mo: Visit date not found  Next physical within 3 mo: Visit date not found  Prescriber OR PCP: Teddy Virk MD  Last diagnosis associated with med order: 1. Anxiety  - escitalopram oxalate (LEXAPRO) 5 MG tablet [Pharmacy Med Name: ESCITALOPRAM 5MG TABLETS]; TAKE 1 TABLET(5 MG) BY MOUTH DAILY  Dispense: 90 tablet; Refill: 2    2. Insomnia  - traZODone (DESYREL) 50 MG tablet [Pharmacy Med Name: TRAZODONE 50MG TABLETS]; TAKE 2 TABLETS(100 MG) BY MOUTH AT BEDTIME  Dispense: 180 tablet; Refill: 1    If protocol passes may refill for 12 months if within 3 months of last provider visit (or a total of 15 months).

## 2021-06-13 NOTE — TELEPHONE ENCOUNTER
Refill request on Trazadone and escitalopram  Last filled 8/20/20 for #180/ #90 Last seen 12/2/2019- unsure of time frame of follow up. Peyton Abraham LPN

## 2021-06-15 NOTE — TELEPHONE ENCOUNTER
Called pt and notified of message below. Establish care appointment scheduled with Alfred Guillaume CNP for 2/26/21.    Carmita Brizuela CMA

## 2021-06-15 NOTE — TELEPHONE ENCOUNTER
Please call the patient.  >1-year since last seen.  30-day prescription sent to the pharmacy.  See about getting him in for a physical with me in the next month.    Alfred Guillaume, CNP

## 2021-06-15 NOTE — TELEPHONE ENCOUNTER
Medication: Trazadone 50mg and escitalopram 5mg  Last Date Filled Both on 11/17/20   Last appointment addressing medication use: 12/2/19.      Taken as prescribed from physician notes? YES    CSA in last year: NO    Random Utox in last year: NO  (if any of the above answer NO - schedule with PCP)     Opioids + benzodiazepines? NO  (if the above answer YES - schedule with PCP every 6 months)       All responses suggest: .     Carmita Brizuela, CMA

## 2021-06-15 NOTE — TELEPHONE ENCOUNTER
RN cannot approve Refill Request    RN can NOT refill this medication No established PCP. Last office visit: 11/11/2019 Teddy Virk MD Last Physical: 12/2/2019 Last MTM visit: Visit date not found Last visit same specialty: 11/11/2019 Teddy Virk MD.  Next visit within 3 mo: Visit date not found  Next physical within 3 mo: Visit date not found      Mikki Charles, [facility] Triage/Med Refill 2/16/2021    Requested Prescriptions   Pending Prescriptions Disp Refills     escitalopram oxalate (LEXAPRO) 5 MG tablet 90 tablet 0     Sig: Take 1 tablet (5 mg total) by mouth daily.       [facility] Refill Protocol  Failed - 2/16/2021  1:13 PM        Failed - PCP or prescribing provider visit in last year     Last office visit with prescriber/PCP: 11/11/2019 Teddy Virk MD OR same dept: Visit date not found OR same specialty: 11/11/2019 Teddy Virk MD  Last physical: 12/2/2019 Last MTM visit: Visit date not found   Next visit within 3 mo: Visit date not found  Next physical within 3 mo: Visit date not found  Prescriber OR PCP: Teddy Virk MD  Last diagnosis associated with med order: 1. Anxiety  - escitalopram oxalate (LEXAPRO) 5 MG tablet; Take 1 tablet (5 mg total) by mouth daily.  Dispense: 90 tablet; Refill: 0    2. Insomnia  - traZODone (DESYREL) 50 MG tablet; TAKE 2 TABLETS(100 MG) BY MOUTH AT BEDTIME  Dispense: 180 tablet; Refill: 0    If protocol passes may refill for 12 months if within 3 months of last provider visit (or a total of 15 months).                traZODone (DESYREL) 50 MG tablet 180 tablet 0     Sig: TAKE 2 TABLETS(100 MG) BY MOUTH AT BEDTIME       Tricyclics/Misc Antidepressant/Antianxiety Meds Refill Protocol Failed - 2/16/2021  1:13 PM        Failed - PCP or prescribing provider visit in last year     Last office visit with prescriber/PCP: 11/11/2019 Teddy Virk MD OR same dept: Visit date not found OR same specialty: 11/11/2019 Teddy Virk MD  Last  physical: 12/2/2019 Last MTM visit: Visit date not found   Next visit within 3 mo: Visit date not found  Next physical within 3 mo: Visit date not found  Prescriber OR PCP: Teddy Virk MD  Last diagnosis associated with med order: 1. Anxiety  - escitalopram oxalate (LEXAPRO) 5 MG tablet; Take 1 tablet (5 mg total) by mouth daily.  Dispense: 90 tablet; Refill: 0    2. Insomnia  - traZODone (DESYREL) 50 MG tablet; TAKE 2 TABLETS(100 MG) BY MOUTH AT BEDTIME  Dispense: 180 tablet; Refill: 0    If protocol passes may refill for 12 months if within 3 months of last provider visit (or a total of 15 months).

## 2021-06-16 PROBLEM — E78.2 MIXED HYPERLIPIDEMIA: Status: ACTIVE | Noted: 2019-12-04

## 2021-06-16 NOTE — TELEPHONE ENCOUNTER
RN cannot approve Refill Request    RN can NOT refill this medication No established PCP. Last office visit: 3/29/2019 Alfred Guillaume CNP Last Physical: Visit date not found Last MTM visit: Visit date not found Last visit same specialty: 11/11/2019 Teddy Virk MD.  Next visit within 3 mo: Visit date not found  Next physical within 3 mo: Visit date not found      Mikki Charles, [facility] Triage/Med Refill 3/23/2021    Requested Prescriptions   Pending Prescriptions Disp Refills     escitalopram oxalate (LEXAPRO) 5 MG tablet [Pharmacy Med Name: ESCITALOPRAM 5MG TABLETS] 30 tablet 0     Sig: TAKE 1 TABLET(5 MG) BY MOUTH DAILY       [facility] Refill Protocol  Failed - 3/23/2021  3:31 AM        Failed - PCP or prescribing provider visit in last year     Last office visit with prescriber/PCP: 3/29/2019 Alfred Guillaume CNP OR same dept: Visit date not found OR same specialty: 11/11/2019 Teddy Virk MD  Last physical: Visit date not found Last MTM visit: Visit date not found   Next visit within 3 mo: Visit date not found  Next physical within 3 mo: Visit date not found  Prescriber OR PCP: Alfred Guillaume CNP  Last diagnosis associated with med order: 1. Anxiety  - escitalopram oxalate (LEXAPRO) 5 MG tablet [Pharmacy Med Name: ESCITALOPRAM 5MG TABLETS]; TAKE 1 TABLET(5 MG) BY MOUTH DAILY  Dispense: 30 tablet; Refill: 0    2. Insomnia  - traZODone (DESYREL) 50 MG tablet [Pharmacy Med Name: TRAZODONE 50MG TABLETS]; TAKE 2 TABLETS(100 MG) BY MOUTH AT BEDTIME  Dispense: 60 tablet; Refill: 0    If protocol passes may refill for 12 months if within 3 months of last provider visit (or a total of 15 months).                traZODone (DESYREL) 50 MG tablet [Pharmacy Med Name: TRAZODONE 50MG TABLETS] 60 tablet 0     Sig: TAKE 2 TABLETS(100 MG) BY MOUTH AT BEDTIME       Tricyclics/Misc Antidepressant/Antianxiety Meds Refill Protocol Failed - 3/23/2021  3:31 AM        Failed - PCP or prescribing provider visit in  last year     Last office visit with prescriber/PCP: 3/29/2019 Alfred Guillaume CNP OR same dept: Visit date not found OR same specialty: 11/11/2019 Teddy Virk MD  Last physical: Visit date not found Last MTM visit: Visit date not found   Next visit within 3 mo: Visit date not found  Next physical within 3 mo: Visit date not found  Prescriber OR PCP: Alfred Guillaume CNP  Last diagnosis associated with med order: 1. Anxiety  - escitalopram oxalate (LEXAPRO) 5 MG tablet [Pharmacy Med Name: ESCITALOPRAM 5MG TABLETS]; TAKE 1 TABLET(5 MG) BY MOUTH DAILY  Dispense: 30 tablet; Refill: 0    2. Insomnia  - traZODone (DESYREL) 50 MG tablet [Pharmacy Med Name: TRAZODONE 50MG TABLETS]; TAKE 2 TABLETS(100 MG) BY MOUTH AT BEDTIME  Dispense: 60 tablet; Refill: 0    If protocol passes may refill for 12 months if within 3 months of last provider visit (or a total of 15 months).

## 2021-06-16 NOTE — TELEPHONE ENCOUNTER
Medication: Lexapro 5mg and Trazadone 50mg  Last Date Filled Both on 2/17/21  Last appointment addressing medication use: 12/2/19.      Taken as prescribed from physician notes? YES    CSA in last year: NO    Random Utox in last year: NO  (if any of the above answer NO - schedule with PCP)     Opioids + benzodiazepines? NO  (if the above answer YES - schedule with PCP every 6 months)       All responses suggest: .     Carmita Brizuela, CMA

## 2021-06-16 NOTE — PROGRESS NOTES
Assessment:      Healthy male exam.      Plan:      1. Annual physical exam     2. Anxiety  escitalopram oxalate (LEXAPRO) 5 MG tablet   3. Insomnia  traZODone (DESYREL) 50 MG tablet   4. Mixed hyperlipidemia  Lipid [place] RANDOM   5. Screening for diabetes mellitus  Glucose   6. Healthcare maintenance  Outpatient  Blood Typing (HML ABO/Rh Typing)     Refill of Lexapro and trazodone sent to the pharmacy.  Update screening labs.  We talked about the COVID-19 vaccine.  Also talked about how to wean down Lexapro if desired.  For now he would like to continue which is reasonable.    Subjective:      Gael Mcclain is a 45 y.o. male who presents for an annual exam. The patient reports that there is not domestic violence in his life.     Overall patient is doing okay.  He plans to restart using the gym at work.  Underwent colonoscopy about a year ago and is due to repeat in 4 years.  Continues with Lexapro for management of anxiety along with trazodone.  Both these are going well.     Healthy Habits:    Regular Exercise: 50% of the time.  plans to restart at work.    Sunscreen Use: Sometimes  Healthy Diet: Yes  Dental Visits Regularly: Yes  Seat Belt: Yes  Sexually active: Yes  Monthly Self Testicular Exams:  No  Hemoccults: No  Flex Sig: No  Colonoscopy: Yes and 2/7/20 with repeat in 5 years.    Lipid Profile: Yes  Glucose Screen: Yes  Prevention of Osteoporosis: Yes  Last Dexa: No  Guns at Home:  Yes  Guns Safety Locks:  Yes      Immunization History   Administered Date(s) Administered     INFLUENZA,SEASONAL QUAD, PF, =/> 6months 02/12/2020, 02/16/2021     Influenza, inj, historic,unspecified 11/01/2014, 09/07/2016     Tdap 03/04/2013     Immunization status: up to date and documented.    No exam data present     Current Outpatient Medications   Medication Sig Dispense Refill     escitalopram oxalate (LEXAPRO) 5 MG tablet Take 1 tablet (5 mg total) by mouth daily. Last prescription without a completed appointment 30  tablet 0     traZODone (DESYREL) 50 MG tablet TAKE 2 TABLETS(100 MG) BY MOUTH AT BEDTIME. Last prescription without a completed appointment 60 tablet 0     albuterol (PROAIR HFA;PROVENTIL HFA;VENTOLIN HFA) 90 mcg/actuation inhaler Inhale 2 puffs every 4 (four) hours as needed for wheezing. 1 each 0     No current facility-administered medications for this visit.      Past Medical History:   Diagnosis Date     Anxiety 2014     Insomnia 2015     Open wound     Created by Conversion  Replacement Utility updated for latest IMO load     Past Surgical History:   Procedure Laterality Date     LUMBAR DISCECTOMY  2009    L5-S1     Amoxicillin and Penicillins  Family History   Problem Relation Age of Onset     Heart attack Mother         With Stents     Carotid Endarterectomy Mother      Arthritis Mother      Social History     Socioeconomic History     Marital status:      Spouse name: Not on file     Number of children: Not on file     Years of education: Not on file     Highest education level: Not on file   Occupational History     Not on file   Social Needs     Financial resource strain: Not on file     Food insecurity     Worry: Not on file     Inability: Not on file     Transportation needs     Medical: Not on file     Non-medical: Not on file   Tobacco Use     Smoking status: Former Smoker     Packs/day: 0.00     Quit date: 2004     Years since quittin.2     Smokeless tobacco: Former User   Substance and Sexual Activity     Alcohol use: Not Currently     Drug use: Never     Sexual activity: Not on file   Lifestyle     Physical activity     Days per week: Not on file     Minutes per session: Not on file     Stress: Not on file   Relationships     Social connections     Talks on phone: Not on file     Gets together: Not on file     Attends L[employer] service: Not on file     Active member of club or organization: Not on file     Attends meetings of clubs or organizations: Not on file      "Relationship status: Not on file     Intimate partner violence     Fear of current or ex partner: Not on file     Emotionally abused: Not on file     Physically abused: Not on file     Forced sexual activity: Not on file   Other Topics Concern     Not on file   Social History Narrative     Not on file       Review of Systems  Review of Systems      Review of Systems - History obtained from the patient  General ROS: negative  Psychological ROS: negative  Ophthalmic ROS: negative  ENT ROS: negative  Allergy and Immunology ROS: negative  Hematological and Lymphatic ROS: negative  Endocrine ROS: negative  Breast ROS: negative  Respiratory ROS: negative  Cardiovascular ROS: negative  Gastrointestinal ROS: negative  Genito-Urinary ROS: negative  Musculoskeletal ROS: negative  Neurological ROS: negative  Dermatological ROS: negative      Objective:     Vitals:    04/02/21 1618   BP: 112/72   Pulse: (!) 50   SpO2: 98%   Weight: (!) 240 lb (108.9 kg)   Height: 5' 11.5\" (1.816 m)     Body mass index is 33.01 kg/m .    Physical  Physical Exam     General appearance - alert, well appearing, and in no distress, oriented to person, place, and time and normal appearing weight  Mental status - alert, oriented to person, place, and time, normal mood, behavior, speech, dress, motor activity, and thought processes  Eyes - pupils equal and reactive, extraocular eye movements intact  Ears - bilateral TM's and external ear canals normal  Nose - normal and patent, no erythema, discharge or polyps  Mouth - mucous membranes moist, pharynx normal without lesions  Neck - supple, no significant adenopathy, carotids upstroke normal bilaterally, no bruits  Lymphatics - no palpable lymphadenopathy, no hepatosplenomegaly  Chest - clear to auscultation, no wheezes, rales or rhonchi, symmetric air entry  Heart - normal rate, regular rhythm, normal S1, S2, no murmurs, rubs, clicks or gallops  Abdomen - soft, nontender, nondistended, no masses or " organomegaly  Back exam - full range of motion, no tenderness, palpable spasm or pain on motion  Neurological - alert, oriented, normal speech, no focal findings or movement disorder noted, neck supple without rigidity, cranial nerves II through XII intact, motor and sensory grossly normal bilaterally, normal muscle tone, no tremors, strength 5/5  Musculoskeletal - no joint tenderness, deformity or swelling  Extremities - peripheral pulses normal, no pedal edema, no clubbing or cyanosis  Skin - normal coloration and turgor, no rashes, no suspicious skin lesions noted    Alfred Guillaume, CNP

## 2021-06-16 NOTE — PATIENT INSTRUCTIONS - HE
You can call [phone] to see if there's covid vaccine appointment times available.     Refills sent off to your pharmacy.      Updating cholesterol, blood sugar, and blood typing.     Nice to see you today!

## 2021-06-17 NOTE — PATIENT INSTRUCTIONS - HE
Patient Instructions by Alfred Guillaume CNP at 3/29/2019  4:20 PM     Author: Alfred Guillaume CNP Service: -- Author Type: Nurse Practitioner    Filed: 3/29/2019  4:41 PM Encounter Date: 3/29/2019 Status: Addendum    : Alfred Guillaume CNP (Nurse Practitioner)    Related Notes: Original Note by Alfred Guillaume CNP (Nurse Practitioner) filed at 3/29/2019  4:41 PM       I have sent in a prescription for antibiotic eyedrops to your pharmacy.  Put these in every 4 hours for 10 days.  You should start to notice improvement over the next several days.    I sent in a prescription for an antibiotic called azithromycin.  Take with food.     Thank you for coming in today!    If you receive a survey from [facility] about your experience today, it would be very helpful if you could fill it out to let us know what went well and what we can improve!    General Information:    Today you had your appointment with Alfred Guillaume NP    My hours are:    Monday : Out of clinic  Tuesday : 8:00AM - 5:00 PM  Wednesday: 8:00AM - 5:00 PM  Thursday: 8:00AM - 5:00 PM  Friday: 8:00AM - 5:00 PM    I am not in the office Mondays. Therefore non-urgent calls and medical messages received on Monday will be addressed when I am back in the office on Tuesday. Urgent matters will be reviewed and addressed by one of my partners in the office as needed.    If lab work was done today as part of your evaluation you will generally be contacted via [employer], mail, or phone with the results within 1-5 days. If there is an alarming result we will contact you by phone. Lab results come back at varying times, I generally wait until all lab results are available before making comments on the results.     If you need refills please contact your pharmacy. They will send a refill request to me to review. Please allow 3-5 business days for us to process all refill requests.     My Clinical Assistant is Carmita. Please call us at [phone] or send a  medical message with any questions or concerns.         Patient Education     Azithromycin tablets  Brand Names: Zithromax, Zithromax Tri-Julian, Zithromax Z-Julian  What is this medicine?  AZITHROMYCIN (az ith jonathon MYE sin) is a macrolide antibiotic. It is used to treat or prevent certain kinds of bacterial infections. It will not work for colds, flu, or other viral infections.  How should I use this medicine?  Take this medicine by mouth with a full glass of water. Follow the directions on the prescription label. The tablets can be taken with food or on an empty stomach. If the medicine upsets your stomach, take it with food. Take your medicine at regular intervals. Do not take your medicine more often than directed. Take all of your medicine as directed even if you think your are better. Do not skip doses or stop your medicine early.  Talk to your pediatrician regarding the use of this medicine in children. While this drug may be prescribed for children as young as 6 months for selected conditions, precautions do apply.  What side effects may I notice from receiving this medicine?  Side effects that you should report to your doctor or health care professional as soon as possible:    allergic reactions like skin rash, itching or hives, swelling of the face, lips, or tongue    confusion, nightmares or hallucinations    dark urine    difficulty breathing    hearing loss    irregular heartbeat or chest pain    pain or difficulty passing urine    redness, blistering, peeling or loosening of the skin, including inside the mouth    white patches or sores in the mouth    yellowing of the eyes or skin  Side effects that usually do not require medical attention (report to your doctor or health care professional if they continue or are bothersome):    diarrhea    dizziness, drowsiness    headache    stomach upset or vomiting    tooth discoloration    vaginal irritation  What may interact with this medicine?  Do not take this  medicine with any of the following medications:    lincomycin  This medicine may also interact with the following medications:    amiodarone    antacids    birth control pills    cyclosporine    digoxin    magnesium    nelfinavir    phenytoin    warfarin  What if I miss a dose?  If you miss a dose, take it as soon as you can. If it is almost time for your next dose, take only that dose. Do not take double or extra doses.  Where should I keep my medicine?  Keep out of the reach of children.  Store at room temperature between 15 and 30 degrees C (59 and 86 degrees F). Throw away any unused medicine after the expiration date.  What should I tell my health care provider before I take this medicine?  They need to know if you have any of these conditions:    kidney disease    liver disease    irregular heartbeat or heart disease    an unusual or allergic reaction to azithromycin, erythromycin, other macrolide antibiotics, foods, dyes, or preservatives    pregnant or trying to get pregnant    breast-feeding  What should I watch for while using this medicine?  Tell your doctor or healthcare professional if your symptoms do not start to get better or if they get worse.  Do not treat diarrhea with over the counter products. Contact your doctor if you have diarrhea that lasts more than 2 days or if it is severe and watery.  This medicine can make you more sensitive to the sun. Keep out of the sun. If you cannot avoid being in the sun, wear protective clothing and use sunscreen. Do not use sun lamps or tanning beds/booths.  NOTE:This sheet is a summary. It may not cover all possible information. If you have questions about this medicine, talk to your doctor, pharmacist, or health care provider. Copyright  2018 [employer]    Patient Education     Tobramycin eye solution  Brand Names: AK-Tob, Tobrasol, Tobrex  What is this medicine?  TOBRAMYCIN (toe bra MYE sin) is an aminoglycoside antibiotic. It is used to treat bacterial eye  infections.  How should I use this medicine?  This medicine is only for use in the eye. Do not take by mouth. Follow the directions on the prescription label. Wash hands before and after use. Try not to touch the tip of the dropper to anything, even your eye or fingertips. Tilt your head back slightly and pull your lower eyelid down with your index finger to form a pouch. Squeeze the prescribed number of drops into the pouch. Close the eye gently. Your vision may blur for a few minutes. Use your doses at regular intervals. Do not use your medicine more often than directed. Finish the full course of medicine prescribed by your doctor or health care professional even if you think your condition is better.  Talk to your pediatrician regarding the use of this medicine in children. While this drug may be prescribed for children for selected conditions, precautions do apply.  What side effects may I notice from receiving this medicine?  Side effects that you should report to your doctor or health care professional as soon as possible:    burning, stinging or swelling of the eyelids    increased flow of tears  Side effects that usually do not require medical attention (report to your doctor or health care professional if they continue or are bothersome):    blurred vision    eye irritation, itching  What may interact with this medicine?  Interactions are not expected. Do not use any other eye products without asking your doctor or health care professional.  What if I miss a dose?  If you miss a dose, use it as soon as you can. If it is almost time for your next dose, use only that dose. Do not use double or extra doses.  Where should I keep my medicine?  Keep out of the reach of children.  Store between 8 and 27 degrees C (46 and 80 degrees F). Do not freeze. Throw away any unused medicine after the expiration date.  What should I tell my health care provider before I take this medicine?  They need to know if you have any  of these conditions:    wear contact lenses    an unusual or allergic reaction to tobramycin or other antibiotics, sulfites, foods, dyes or preservatives    pregnant or trying to get pregnant    breast-feeding  What should I watch for while using this medicine?  Tell your doctor or health care professional if your symptoms do not start to get better within a few days.  This medicine can make certain eye conditions worse. Only use it for conditions for which your doctor or health care professional has prescribed.  To prevent the spread of infection, do not share eye products, towels and washcloths with anyone else.  Do not wear contact lenses while you have any signs or symptoms of an eye infection. Ask your doctor or health care professional when you can start wearing your lenses again.  NOTE:This sheet is a summary. It may not cover all possible information. If you have questions about this medicine, talk to your doctor, pharmacist, or health care provider. Copyright  2018 [employer]

## 2021-06-19 NOTE — PROGRESS NOTES
ASSESSMENT:  1. Anxiety  - citalopram (CELEXA) 20 MG tablet; Take 1 tablet (20 mg total) by mouth daily.  Dispense: 90 tablet; Refill: 3    2. Insomnia  - traZODone (DESYREL) 50 MG tablet; Take 1-3 tablets ( mg total) by mouth at bedtime.  Dispense: 270 tablet; Refill: 2      PLAN:  Reviewed the previous notes.Reviewed the side effects of the medications and refilled them.Discussed the Healthcare Maintenance and encouraged him to come in for his physical.Questions were answered.    No orders of the defined types were placed in this encounter.    Medications Discontinued During This Encounter   Medication Reason     traZODone (DESYREL) 100 MG tablet Duplicate order     nystatin (MYCOSTATIN) powder Surgery     ketoconazole (NIZORAL) 2 % cream Therapy completed     betamethasone dipropionate (DIPROLENE) 0.05 % cream Therapy completed     traZODone (DESYREL) 50 MG tablet Reorder     citalopram (CELEXA) 20 MG tablet Reorder       No Follow-up on file.      CHIEF COMPLAINT:  Chief Complaint   Patient presents with     Follow-up     Med check- refill Citalopram and Trazodone.        HISTORY OF PRESENT ILLNESS:  Gael is a 42 y.o. male presenting to the clinic today for follow up for anxiety as well as insomnia.  He is been taking citalopram at 20 mg daily for anxiety and takes the trazodone.  He noted taking 100 mg of trazodone and that is helpful. He denies any side effects.He denies any depression.Does not have any issues with concentration.He is active.      REVIEW OF SYSTEMS:   Full review of system was done and was essentially negative.  He does not have any other major concerns at this point.  All other systems are negative.    PFSH:  Reviewed, as below.    History   Smoking Status     Former Smoker     Quit date: 1/1/2004   Smokeless Tobacco     Former User       Family History   Problem Relation Age of Onset     Heart attack Mother      With Stents     Carotid Endarterectomy Mother      Arthritis Mother         Social History     Social History     Marital status:      Spouse name: N/A     Number of children: N/A     Years of education: N/A     Occupational History     Not on file.     Social History Main Topics     Smoking status: Former Smoker     Quit date: 1/1/2004     Smokeless tobacco: Former User     Alcohol use Not on file     Drug use: Not on file     Sexual activity: Not on file     Other Topics Concern     Not on file     Social History Narrative       Past Surgical History:   Procedure Laterality Date     LUMBAR DISCECTOMY  2009    L5-S1       Allergies   Allergen Reactions     Penicillins        Active Ambulatory Problems     Diagnosis Date Noted     Open Wound      Anxiety 12/17/2014     Insomnia 01/08/2015     Epicondylitis, lateral 04/15/2015     Middle ear effusion 04/15/2015     Resolved Ambulatory Problems     Diagnosis Date Noted     No Resolved Ambulatory Problems     Past Medical History:   Diagnosis Date     Anxiety 12/17/2014     Insomnia 1/8/2015       Current Outpatient Prescriptions   Medication Sig Dispense Refill     citalopram (CELEXA) 20 MG tablet Take 1 tablet (20 mg total) by mouth daily. 90 tablet 3     traZODone (DESYREL) 50 MG tablet Take 1-3 tablets ( mg total) by mouth at bedtime. 270 tablet 2     No current facility-administered medications for this visit.        VITALS:  Vitals:    07/06/18 1434   BP: 108/60   Patient Site: Left Arm   Patient Position: Sitting   Cuff Size: Adult Large   Pulse: (!) 49   SpO2: 99%   Weight: (!) 235 lb 4.8 oz (106.7 kg)     Wt Readings from Last 3 Encounters:   07/06/18 (!) 235 lb 4.8 oz (106.7 kg)   10/12/16 (!) 231 lb 3.2 oz (104.9 kg)   10/07/16 (!) 228 lb (103.4 kg)     Body mass index is 31.47 kg/(m^2).    PHYSICAL EXAM:  General Appearance: Alert, cooperative, no distress, appears stated age  HEENT: Pupils are equal and reactive, extraocular motions is normal. Neck is supple no notable thyromegaly.  External ears are  normal.  Lungs: Clear to auscultation bilaterally, respirations unlabored  Heart: Regular rate and rhythm, S1 and S2 normal, no murmur, rub, or gallop  Abdomen: Soft  Musculoskeletal: Normal range of motion. No joint swelling or deformity.   Neurologic:  Alert and oriented times 3.  Psychiatric: Normal mood and affect.    MEDICATIONS:  Current Outpatient Prescriptions   Medication Sig Dispense Refill     citalopram (CELEXA) 20 MG tablet Take 1 tablet (20 mg total) by mouth daily. 90 tablet 3     traZODone (DESYREL) 50 MG tablet Take 1-3 tablets ( mg total) by mouth at bedtime. 270 tablet 2     No current facility-administered medications for this visit.      Patient's

## 2021-06-21 NOTE — LETTER
Letter by Alfred Guillaume CNP at      Author: Alfred Guillaume CNP Service: -- Author Type: --    Filed:  Encounter Date: 4/4/2021 Status: (Other)         Gael Mcclain  [address] [place] [address]             April 4, 2021         Dear Jameson TovarMcclain,    Below are the results from your recent visit:    Resulted Orders   Lipid Cascade RANDOM   Result Value Ref Range    Cholesterol 212 (H) <=199 mg/dL    Triglycerides 115 <=149 mg/dL    HDL Cholesterol 33 (L) >=40 mg/dL    LDL Calculated 156 (H) <=129 mg/dL    Patient Fasting > 8hrs? No    Glucose   Result Value Ref Range    Glucose 77 74 - 125 mg/dL    Patient Fasting > 8hrs? No     Narrative    Fasting Glucose reference range is 70-99 mg/dL per  American Diabetes Association (ADA) guidelines.   Outpatient  Blood Typing (HML ABO/Rh Typing)   Result Value Ref Range    HML ABO/Rh Typing O POS     HML ABO/Rh Repeat Typing O POS                        You have O+ blood which is actually the most common blood type at about 37% of the population.  Blood sugar levels are perfect.  Cholesterol levels are mildly elevated, but this can be explained with labs being drawn later in the day.    Please call with questions or contact us using [employer].    Sincerely,         Alfred Guillaume CNP

## 2021-07-03 NOTE — ADDENDUM NOTE
Addendum Note by Peter Elizondo LPN at 6/28/2018  1:30 PM     Author: Peter Elizondo LPN Service: -- Author Type: Licensed Nurse    Filed: 6/28/2018  1:30 PM Encounter Date: 6/21/2018 Status: Signed    : Peter Elizondo LPN (Licensed Nurse)    Addended by: PETER ELIZONDO on: 6/28/2018 01:30 PM        Modules accepted: Orders

## 2022-03-07 ENCOUNTER — TELEPHONE (OUTPATIENT)
Dept: FAMILY MEDICINE | Facility: CLINIC | Age: 47
End: 2022-03-07

## 2022-03-07 DIAGNOSIS — G47.00 INSOMNIA, UNSPECIFIED TYPE: ICD-10-CM

## 2022-03-07 RX ORDER — TRAZODONE HYDROCHLORIDE 50 MG/1
TABLET, FILM COATED ORAL
Qty: 14 TABLET | Refills: 0 | Status: SHIPPED | OUTPATIENT
Start: 2022-03-07 | End: 2022-04-20

## 2022-03-07 NOTE — TELEPHONE ENCOUNTER
Patient out of town and forgot Trazodone rx.     Would like 7 day supply sent to [facility] in [place], [place].         #14 tablets pending approval.

## 2022-03-07 NOTE — TELEPHONE ENCOUNTER
Patient calls back to check on request. Writer inform patient message was sent to covering provider as Alfred Guillaume is out of office today. Writer will send covering provider another message. Caller verbalizes understanding.

## 2022-04-03 ENCOUNTER — HEALTH MAINTENANCE LETTER (OUTPATIENT)
Age: 47
End: 2022-04-03

## 2022-05-29 ENCOUNTER — HEALTH MAINTENANCE LETTER (OUTPATIENT)
Age: 47
End: 2022-05-29

## 2022-06-17 DIAGNOSIS — G47.00 INSOMNIA, UNSPECIFIED TYPE: ICD-10-CM

## 2022-06-17 DIAGNOSIS — F41.9 ANXIETY: ICD-10-CM

## 2022-06-17 NOTE — TELEPHONE ENCOUNTER
"Routing refill request to provider for review/approval because:  Patient needs to be seen because it has been more than 1 year since last office visit.    Last Written Prescription Date:  4/20/22  Last Fill Quantity: 120,  # refills: 0   Last office visit provider:  4/2/21     Requested Prescriptions   Pending Prescriptions Disp Refills     traZODone (DESYREL) 50 MG tablet [Pharmacy Med Name: TRAZODONE 50MG TABLETS] 120 tablet 0     Sig: TAKE 2 TABLETS BY MOUTH AT BEDTIME       Serotonin Modulators Failed - 6/17/2022  3:29 AM        Failed - Recent (12 mo) or future (30 days) visit within the authorizing provider's specialty     Patient has had an office visit with the authorizing provider or a provider within the authorizing providers department within the previous 12 mos or has a future within next 30 days. See \"Patient Info\" tab in inbasket, or \"Choose Columns\" in Meds & Orders section of the refill encounter.              Passed - Medication is active on med list        Passed - Patient is age 18 or older           escitalopram (LEXAPRO) 5 MG tablet [Pharmacy Med Name: ESCITALOPRAM 5MG TABLETS] 60 tablet 0     Sig: TAKE 1 TABLET(5 MG) BY MOUTH DAILY       SSRIs Protocol Failed - 6/17/2022  3:29 AM        Failed - Recent (12 mo) or future (30 days) visit within the authorizing provider's specialty     Patient has had an office visit with the authorizing provider or a provider within the authorizing providers department within the previous 12 mos or has a future within next 30 days. See \"Patient Info\" tab in inbasket, or \"Choose Columns\" in Meds & Orders section of the refill encounter.              Passed - Medication is active on med list        Passed - Patient is age 18 or older             Sherwood, Martha, RN 06/17/22 6:37 PM  "

## 2022-06-20 NOTE — TELEPHONE ENCOUNTER
Please try one more time to schedule his annual and I can then bridge. April communication went unanswered.    Alfred Guillaume, CNP

## 2022-06-21 RX ORDER — TRAZODONE HYDROCHLORIDE 50 MG/1
TABLET, FILM COATED ORAL
Qty: 60 TABLET | Refills: 0 | Status: SHIPPED | OUTPATIENT
Start: 2022-06-21 | End: 2022-07-08

## 2022-06-21 RX ORDER — ESCITALOPRAM OXALATE 5 MG/1
TABLET ORAL
Qty: 30 TABLET | Refills: 0 | Status: SHIPPED | OUTPATIENT
Start: 2022-06-21 | End: 2022-07-08

## 2022-06-21 NOTE — TELEPHONE ENCOUNTER
Left message to call back for: Gael  Information to relay to patient: Please relay message below

## 2022-07-08 ENCOUNTER — OFFICE VISIT (OUTPATIENT)
Dept: FAMILY MEDICINE | Facility: CLINIC | Age: 47
End: 2022-07-08
Payer: COMMERCIAL

## 2022-07-08 VITALS
SYSTOLIC BLOOD PRESSURE: 114 MMHG | BODY MASS INDEX: 30.35 KG/M2 | HEIGHT: 72 IN | HEART RATE: 55 BPM | OXYGEN SATURATION: 97 % | TEMPERATURE: 98 F | DIASTOLIC BLOOD PRESSURE: 68 MMHG | WEIGHT: 224.1 LBS

## 2022-07-08 DIAGNOSIS — Z11.59 NEED FOR HEPATITIS C SCREENING TEST: ICD-10-CM

## 2022-07-08 DIAGNOSIS — F41.9 ANXIETY: ICD-10-CM

## 2022-07-08 DIAGNOSIS — G47.00 INSOMNIA, UNSPECIFIED TYPE: ICD-10-CM

## 2022-07-08 DIAGNOSIS — Z11.4 SCREENING FOR HIV (HUMAN IMMUNODEFICIENCY VIRUS): ICD-10-CM

## 2022-07-08 DIAGNOSIS — Z00.00 ANNUAL PHYSICAL EXAM: Primary | ICD-10-CM

## 2022-07-08 DIAGNOSIS — Z13.1 SCREENING FOR DIABETES MELLITUS: ICD-10-CM

## 2022-07-08 DIAGNOSIS — E78.2 MIXED HYPERLIPIDEMIA: ICD-10-CM

## 2022-07-08 LAB
ANION GAP SERPL CALCULATED.3IONS-SCNC: 10 MMOL/L (ref 7–15)
BUN SERPL-MCNC: 18.1 MG/DL (ref 6–20)
CALCIUM SERPL-MCNC: 9.1 MG/DL (ref 8.6–10)
CHLORIDE SERPL-SCNC: 104 MMOL/L (ref 98–107)
CHOLEST SERPL-MCNC: 254 MG/DL
CREAT SERPL-MCNC: 0.87 MG/DL (ref 0.67–1.17)
DEPRECATED HCO3 PLAS-SCNC: 25 MMOL/L (ref 22–29)
GFR SERPL CREATININE-BSD FRML MDRD: >90 ML/MIN/1.73M2
GLUCOSE SERPL-MCNC: 90 MG/DL (ref 70–99)
HDLC SERPL-MCNC: 37 MG/DL
LDLC SERPL CALC-MCNC: 174 MG/DL
NONHDLC SERPL-MCNC: 217 MG/DL
POTASSIUM SERPL-SCNC: 4.6 MMOL/L (ref 3.4–5.3)
SODIUM SERPL-SCNC: 139 MMOL/L (ref 136–145)
TRIGL SERPL-MCNC: 215 MG/DL

## 2022-07-08 PROCEDURE — 86803 HEPATITIS C AB TEST: CPT | Performed by: NURSE PRACTITIONER

## 2022-07-08 PROCEDURE — 87389 HIV-1 AG W/HIV-1&-2 AB AG IA: CPT | Performed by: NURSE PRACTITIONER

## 2022-07-08 PROCEDURE — 99396 PREV VISIT EST AGE 40-64: CPT | Performed by: NURSE PRACTITIONER

## 2022-07-08 PROCEDURE — 80061 LIPID PANEL: CPT | Performed by: NURSE PRACTITIONER

## 2022-07-08 PROCEDURE — 80048 BASIC METABOLIC PNL TOTAL CA: CPT | Performed by: NURSE PRACTITIONER

## 2022-07-08 PROCEDURE — 36415 COLL VENOUS BLD VENIPUNCTURE: CPT | Performed by: NURSE PRACTITIONER

## 2022-07-08 RX ORDER — ESCITALOPRAM OXALATE 5 MG/1
TABLET ORAL
Qty: 90 TABLET | Refills: 3 | Status: SHIPPED | OUTPATIENT
Start: 2022-07-08 | End: 2023-06-22

## 2022-07-08 RX ORDER — TRAZODONE HYDROCHLORIDE 50 MG/1
TABLET, FILM COATED ORAL
Qty: 180 TABLET | Refills: 3 | Status: SHIPPED | OUTPATIENT
Start: 2022-07-08 | End: 2023-07-10

## 2022-07-08 ASSESSMENT — ENCOUNTER SYMPTOMS
COUGH: 0
HEARTBURN: 0
ARTHRALGIAS: 1
FEVER: 0
SHORTNESS OF BREATH: 0
HEMATOCHEZIA: 0
EYE PAIN: 0
HEMATURIA: 0
PALPITATIONS: 0
ABDOMINAL PAIN: 0
CONSTIPATION: 0
FREQUENCY: 0
DIARRHEA: 0
JOINT SWELLING: 0
CHILLS: 0
PARESTHESIAS: 0
SORE THROAT: 0
DYSURIA: 0
NAUSEA: 0
MYALGIAS: 1
DIZZINESS: 0
HEADACHES: 1
NERVOUS/ANXIOUS: 1
WEAKNESS: 0

## 2022-07-08 ASSESSMENT — PAIN SCALES - GENERAL: PAINLEVEL: MILD PAIN (2)

## 2022-07-08 NOTE — PROGRESS NOTES
SUBJECTIVE:   CC: Gael Mcclain is an 46 year old male who presents for preventative health visit.     Doing well.  Still on low-dose Lexapro.  Trazodone still helpful for sleep initiation/maintenance.  Recently moved into a condo with his family looking to buy property    Patient has been advised of split billing requirements and indicates understanding: Yes  Healthy Habits:   PHQ-2 Total Score: 1    Today's PHQ-2 Score:   PHQ-2 (  [employer]) 2022   Q1: Little interest or pleasure in doing things 1   Q2: Feeling down, depressed or hopeless 0   PHQ-2 Score 1   Q1: Little interest or pleasure in doing things Several days   Q2: Feeling down, depressed or hopeless Not at all   PHQ-2 Score 1       Abuse: Current or Past(Physical, Sexual or Emotional)- No  Do you feel safe in your environment? Yes    Have you ever done Advance Care Planning? (For example, a Health Directive, POLST, or a discussion with a medical provider or your loved ones about your wishes): No, advance care planning information given to patient to review.  Patient declined advance care planning discussion at this time.    Social History     Tobacco Use     Smoking status: Former Smoker     Packs/day: 0.00     Quit date: 2004     Years since quittin.5     Smokeless tobacco: Former User   Substance Use Topics     Alcohol use: Not Currently       Alcohol Use 2022   Prescreen: >3 drinks/day or >7 drinks/week? No     Last PSA: No results found for: PSA    Reviewed orders with patient. Reviewed health maintenance and updated orders accordingly - Yes  Lab work is in process    Reviewed and updated as needed this visit by clinical staff   Tobacco  Allergies  Meds    Surg Hx             Reviewed and updated as needed this visit by Provider        Surg Hx            Past Medical History:   Diagnosis Date     Anxiety 2014     Insomnia 2015     Open wound     Created by Conversion  Replacement Utility updated for latest IMO load  "     Past Surgical History:   Procedure Laterality Date     LUMBAR DISCECTOMY  2009    L5-S1       Review of Systems  CONSTITUTIONAL: NEGATIVE for fever, chills, change in weight  INTEGUMENTARY/SKIN: NEGATIVE for worrisome rashes, moles or lesions  EYES: NEGATIVE for vision changes or irritation  ENT: NEGATIVE for ear, mouth and throat problems  RESP: NEGATIVE for significant cough or SOB  CV: NEGATIVE for chest pain, palpitations or peripheral edema  GI: NEGATIVE for nausea, abdominal pain, heartburn, or change in bowel habits   male: negative for dysuria, hematuria, decreased urinary stream, erectile dysfunction, urethral discharge  MUSCULOSKELETAL: NEGATIVE for significant arthralgias or myalgia  NEURO: NEGATIVE for weakness, dizziness or paresthesias  PSYCHIATRIC: NEGATIVE for changes in mood or affect    OBJECTIVE:   /68 (BP Location: Right arm, Patient Position: Sitting, Cuff Size: Adult Large)   Pulse 55   Temp 98  F (36.7  C)   Ht 1.816 m (5' 11.5\")   Wt 101.7 kg (224 lb 1.6 oz)   SpO2 97%   BMI 30.82 kg/m      Physical Exam  GENERAL: healthy, alert and no distress  EYES: Eyes grossly normal to inspection, PERRL and conjunctivae and sclerae normal  HENT: ear canals and TM's normal, nose and mouth without ulcers or lesions  NECK: no adenopathy, no asymmetry, masses, or scars and thyroid normal to palpation  RESP: lungs clear to auscultation - no rales, rhonchi or wheezes  CV: regular rate and rhythm, normal S1 S2, no S3 or S4, no murmur, click or rub, no peripheral edema and peripheral pulses strong  ABDOMEN: soft, nontender, no hepatosplenomegaly, no masses and bowel sounds normal  MS: no gross musculoskeletal defects noted, no edema  SKIN: no suspicious lesions or rashes  NEURO: Normal strength and tone, mentation intact and speech normal  PSYCH: mentation appears normal, affect normal/bright    Diagnostic Test Results:  Labs reviewed in Epic    ASSESSMENT/PLAN:       ICD-10-CM    1. Annual " "physical exam  Z00.00    2. Screening for HIV (human immunodeficiency virus)  Z11.4 HIV Antigen Antibody Combo     HIV Antigen Antibody Combo   3. Need for hepatitis C screening test  Z11.59 Hepatitis C Screen Reflex to HCV RNA Quant and Genotype     Hepatitis C Screen Reflex to HCV RNA Quant and Genotype   4. Insomnia, unspecified type  G47.00 traZODone (DESYREL) 50 MG tablet   5. Mixed hyperlipidemia  E78.2 Lipid panel     Lipid panel   6. Screening for diabetes mellitus  Z13.1 Basic metabolic panel     Basic metabolic panel   7. Anxiety  F41.9 escitalopram (LEXAPRO) 5 MG tablet     Doing well.  Continue working on weight loss.  Reviewed sleep hygiene and nonmedical interventions.  Discussed the opportunity of coming off of Lexapro to see if still needed.  Reviewed healthy living behaviors.  COVID booster declined.  Colonoscopy due in 2025.    Patient has been advised of split billing requirements and indicates understanding: No    COUNSELING:   Reviewed preventive health counseling, as reflected in patient instructions    Estimated body mass index is 30.82 kg/m  as calculated from the following:    Height as of this encounter: 1.816 m (5' 11.5\").    Weight as of this encounter: 101.7 kg (224 lb 1.6 oz).     Weight management plan: Discussed healthy diet and exercise guidelines    He reports that he quit smoking about 18 years ago. He smoked 0.00 packs per day. He has quit using smokeless tobacco.      Counseling Resources:  ATP IV Guidelines  Pooled Cohorts Equation Calculator  FRAX Risk Assessment  ICSI Preventive Guidelines  Dietary Guidelines for Americans, 2010  USDA's MyPlate  ASA Prophylaxis  Lung CA Screening    Alfred Guillaume NP  [facility]  "

## 2022-07-11 LAB
HCV AB SERPL QL IA: NONREACTIVE
HIV 1+2 AB+HIV1 P24 AG SERPL QL IA: NONREACTIVE

## 2022-09-08 ENCOUNTER — VIRTUAL VISIT (OUTPATIENT)
Dept: INTERNAL MEDICINE | Facility: CLINIC | Age: 47
End: 2022-09-08
Payer: COMMERCIAL

## 2022-09-08 DIAGNOSIS — U07.1 INFECTION DUE TO 2019 NOVEL CORONAVIRUS: Primary | ICD-10-CM

## 2022-09-08 DIAGNOSIS — R21 RASH ASSOCIATED WITH COVID-19: ICD-10-CM

## 2022-09-08 DIAGNOSIS — U07.1 RASH ASSOCIATED WITH COVID-19: ICD-10-CM

## 2022-09-08 DIAGNOSIS — F41.9 ANXIETY: ICD-10-CM

## 2022-09-08 PROCEDURE — 99213 OFFICE O/P EST LOW 20 MIN: CPT | Mod: CS | Performed by: INTERNAL MEDICINE

## 2022-09-08 NOTE — PATIENT INSTRUCTIONS
Do not take your escitalopram/Lexapro while on the COVID antiviral treatment.    Only take a half dose of your trazodone at night if needed.  Do not take the full dose.    You should feel better in 5 to 7 days.    If you have increasing shortness of breath or chest pain, go to the emergency room for immediate evaluation.    Follow-up your primary care clinic if not better in 1 week.

## 2022-09-08 NOTE — PROGRESS NOTES
Gael is a 47 year old who is being evaluated via a billable video visit.      How would you like to obtain your AVS? MyChart  If the video visit is dropped, the invitation should be resent by: Text to cell phone: [phone]  Will anyone else be joining your video visit? No          Assessment & Plan     Infection due to 2019 novel coronavirus  Symptoms started 4 days ago with mild symptoms and myalgias.  He had fever for the past 2 days.  No current fever.  No shortness of breath or chest pain.    He did develop a significant rash over the past 2 days.  Rash is consistent with COVID rash.    The patient instructions.  Patient does wish to proceed with antiviral treatment.  - nirmatrelvir and ritonavir (PAXLOVID) therapy pack  Dispense: 30 each; Refill: 0    Rash associated with COVID-19  Patient was warned to monitor for secondary infection.  He can use Neosporin on any open areas.  I should improve as his COVID improves.  Primary care clinic next week if not better.    Anxiety  Patient was told not to take escitalopram while on the antiviral.  Reduce the trazodone by half dose                   Return in about 1 week (around 9/15/2022) for Follow up with primary clinic.    Andres Sevilla MD  [facility]   Gael is a 47 year old accompanied by his Wife, presenting for the following health issues:  Derm Problem (Rash looks like acne on chest, back and stomach. X 2 days painful to the touch. Tested positive for covid on 9/5. Had fever x 2 days.)      HPI       COVID-19 Symptom Review  How many days ago did these symptoms start? 4    Are any of the following symptoms significant for you?    New or worsening difficulty breathing? No    Worsening cough? Yes, it's a dry cough.     Fever or chills? Yes, the highest temperature was unknown    Headache: No    Sore throat: No    Chest pain: No    Diarrhea: No    Body aches? YES    What treatments has patient tried?  None  Does  patient live in a nursing home, group home, or shelter? No  Does patient have a way to get food/medications during quarantined? Yes, I have a friend or family member who can help me.                  Review of Systems         Objective           Vitals:  No vitals were obtained today due to virtual visit.    Physical Exam   Patient does not appear in distress.  No conjunctivitis.  He has a diffuse bilateral erythematous papules with white center, no ulcerations.  No large erythematous confluent areas.  Consistent with COVID rash.            Video-Visit Details    Video Start Time: 2:40 PM    Type of service:  Video Visit    Video End Time:2:54 PM    Originating Location (pt. Location): Home    Distant Location (provider location):  [facility]     Platform used for Video Visit: Angle

## 2022-10-02 ENCOUNTER — HEALTH MAINTENANCE LETTER (OUTPATIENT)
Age: 47
End: 2022-10-02

## 2023-06-22 DIAGNOSIS — F41.9 ANXIETY: ICD-10-CM

## 2023-06-22 RX ORDER — ESCITALOPRAM OXALATE 5 MG/1
TABLET ORAL
Qty: 90 TABLET | Refills: 0 | Status: SHIPPED | OUTPATIENT
Start: 2023-06-22 | End: 2023-08-11

## 2023-06-22 NOTE — TELEPHONE ENCOUNTER
"Routing refill request to provider for review/approval because:  No previous warning over ride    Last Written Prescription Date:  7/8/2022  Last Fill Quantity: 90,  # refills: 3   Last office visit provider:  9/8/2022     Requested Prescriptions   Pending Prescriptions Disp Refills     escitalopram (LEXAPRO) 5 MG tablet [Pharmacy Med Name: ESCITALOPRAM 5MG TABLETS] 90 tablet 0     Sig: TAKE 1 TABLET(5 MG) BY MOUTH DAILY       SSRIs Protocol Passed - 6/22/2023  8:22 AM        Passed - Recent (12 mo) or future (30 days) visit within the authorizing provider's specialty     Patient has had an office visit with the authorizing provider or a provider within the authorizing providers department within the previous 12 mos or has a future within next 30 days. See \"Patient Info\" tab in inbasket, or \"Choose Columns\" in Meds & Orders section of the refill encounter.              Passed - Medication is active on med list        Passed - Patient is age 18 or older             Carine Saenz RN 06/22/23 8:25 AM  "

## 2023-06-25 DIAGNOSIS — F41.9 ANXIETY: ICD-10-CM

## 2023-06-26 RX ORDER — ESCITALOPRAM OXALATE 5 MG/1
TABLET ORAL
Qty: 90 TABLET | Refills: 0 | OUTPATIENT
Start: 2023-06-26

## 2023-06-26 NOTE — TELEPHONE ENCOUNTER
Medication refill request declined: already addressed by provider    Disp Refills Start End KELLY   escitalopram (LEXAPRO) 5 MG tablet 90 tablet 0 6/22/2023  No   Sig: TAKE 1 TABLET(5 MG) BY MOUTH DAILY   Sent to pharmacy as: Escitalopram Oxalate 5 MG Oral Tablet (LEXAPRO)   Class: E-Prescribe   Order: [id number]   E-Prescribing Status: Receipt confirmed by pharmacy (6/22/2023  6:19 PM CDT)     Brianne Ward RN BSN 6/26/2023 12:09 PM

## 2023-07-10 DIAGNOSIS — G47.00 INSOMNIA, UNSPECIFIED TYPE: ICD-10-CM

## 2023-07-10 NOTE — TELEPHONE ENCOUNTER
"Routing refill request to provider for review/approval because:  Drug interaction warning    Last Written Prescription Date:  7/8/2022  Last Fill Quantity: 180,  # refills: 3   Last office visit provider:  9/8/2022     Requested Prescriptions   Pending Prescriptions Disp Refills     traZODone (DESYREL) 50 MG tablet [Pharmacy Med Name: TRAZODONE 50MG TABLETS] 180 tablet 0     Sig: TAKE 2 TABLETS BY MOUTH AT BEDTIME       Serotonin Modulators Failed - 7/10/2023  9:06 AM        Failed - Recent (12 mo) or future (30 days) visit within the authorizing provider's specialty     Patient has had an office visit with the authorizing provider or a provider within the authorizing providers department within the previous 12 mos or has a future within next 30 days. See \"Patient Info\" tab in inbasket, or \"Choose Columns\" in Meds & Orders section of the refill encounter.              Passed - Medication is active on med list        Passed - Patient is age 18 or older             ERICH CHANEY RN 07/10/23 9:09 AM  "

## 2023-07-12 RX ORDER — TRAZODONE HYDROCHLORIDE 50 MG/1
TABLET, FILM COATED ORAL
Qty: 180 TABLET | Refills: 0 | Status: SHIPPED | OUTPATIENT
Start: 2023-07-12 | End: 2023-08-11

## 2023-08-10 ASSESSMENT — ENCOUNTER SYMPTOMS
EYE PAIN: 0
MYALGIAS: 1
HEMATOCHEZIA: 0
NAUSEA: 0
DYSURIA: 0
ABDOMINAL PAIN: 0
CONSTIPATION: 0
JOINT SWELLING: 0
PALPITATIONS: 0
HEADACHES: 1
SORE THROAT: 0
COUGH: 0
PARESTHESIAS: 0
DIARRHEA: 1
NERVOUS/ANXIOUS: 1
SHORTNESS OF BREATH: 0
HEARTBURN: 1
HEMATURIA: 0
ARTHRALGIAS: 0
FEVER: 0
WEAKNESS: 0
CHILLS: 0
FREQUENCY: 0
DIZZINESS: 0

## 2023-08-11 ENCOUNTER — OFFICE VISIT (OUTPATIENT)
Dept: FAMILY MEDICINE | Facility: CLINIC | Age: 48
End: 2023-08-11
Payer: COMMERCIAL

## 2023-08-11 VITALS
TEMPERATURE: 98.2 F | HEART RATE: 51 BPM | WEIGHT: 239.7 LBS | DIASTOLIC BLOOD PRESSURE: 70 MMHG | BODY MASS INDEX: 32.47 KG/M2 | RESPIRATION RATE: 16 BRPM | HEIGHT: 72 IN | OXYGEN SATURATION: 97 % | SYSTOLIC BLOOD PRESSURE: 112 MMHG

## 2023-08-11 DIAGNOSIS — R41.840 ATTENTION AND CONCENTRATION DEFICIT: ICD-10-CM

## 2023-08-11 DIAGNOSIS — Z00.00 ROUTINE GENERAL MEDICAL EXAMINATION AT A HEALTH CARE FACILITY: Primary | ICD-10-CM

## 2023-08-11 DIAGNOSIS — G47.00 INSOMNIA, UNSPECIFIED TYPE: ICD-10-CM

## 2023-08-11 DIAGNOSIS — F41.9 ANXIETY: ICD-10-CM

## 2023-08-11 PROCEDURE — 99396 PREV VISIT EST AGE 40-64: CPT | Performed by: NURSE PRACTITIONER

## 2023-08-11 RX ORDER — ESCITALOPRAM OXALATE 10 MG/1
10 TABLET ORAL DAILY
Qty: 90 TABLET | Refills: 0 | Status: SHIPPED | OUTPATIENT
Start: 2023-08-11 | End: 2023-11-07

## 2023-08-11 RX ORDER — ATOMOXETINE 18 MG/1
18 CAPSULE ORAL DAILY
Qty: 90 CAPSULE | Refills: 0 | Status: SHIPPED | OUTPATIENT
Start: 2023-08-11 | End: 2023-11-08

## 2023-08-11 RX ORDER — TRAZODONE HYDROCHLORIDE 50 MG/1
100 TABLET, FILM COATED ORAL AT BEDTIME
Qty: 180 TABLET | Refills: 3 | Status: SHIPPED | OUTPATIENT
Start: 2023-08-11

## 2023-08-11 ASSESSMENT — ENCOUNTER SYMPTOMS
DYSURIA: 0
MYALGIAS: 1
HEARTBURN: 1
WEAKNESS: 0
CONSTIPATION: 0
SHORTNESS OF BREATH: 0
HEADACHES: 1
ARTHRALGIAS: 0
JOINT SWELLING: 0
NAUSEA: 0
CHILLS: 0
FEVER: 0
EYE PAIN: 0
DIZZINESS: 0
SORE THROAT: 0
PALPITATIONS: 0
NERVOUS/ANXIOUS: 1
PARESTHESIAS: 0
ABDOMINAL PAIN: 0
DIARRHEA: 1
COUGH: 0
FREQUENCY: 0
HEMATOCHEZIA: 0
HEMATURIA: 0

## 2023-08-11 ASSESSMENT — ANXIETY QUESTIONNAIRES
2. NOT BEING ABLE TO STOP OR CONTROL WORRYING: MORE THAN HALF THE DAYS
IF YOU CHECKED OFF ANY PROBLEMS ON THIS QUESTIONNAIRE, HOW DIFFICULT HAVE THESE PROBLEMS MADE IT FOR YOU TO DO YOUR WORK, TAKE CARE OF THINGS AT HOME, OR GET ALONG WITH OTHER PEOPLE: VERY DIFFICULT
1. FEELING NERVOUS, ANXIOUS, OR ON EDGE: NEARLY EVERY DAY
4. TROUBLE RELAXING: MORE THAN HALF THE DAYS
6. BECOMING EASILY ANNOYED OR IRRITABLE: NEARLY EVERY DAY
GAD7 TOTAL SCORE: 15
7. FEELING AFRAID AS IF SOMETHING AWFUL MIGHT HAPPEN: NOT AT ALL
GAD7 TOTAL SCORE: 15
3. WORRYING TOO MUCH ABOUT DIFFERENT THINGS: NEARLY EVERY DAY
5. BEING SO RESTLESS THAT IT IS HARD TO SIT STILL: MORE THAN HALF THE DAYS

## 2023-08-11 ASSESSMENT — PATIENT HEALTH QUESTIONNAIRE - PHQ9
10. IF YOU CHECKED OFF ANY PROBLEMS, HOW DIFFICULT HAVE THESE PROBLEMS MADE IT FOR YOU TO DO YOUR WORK, TAKE CARE OF THINGS AT HOME, OR GET ALONG WITH OTHER PEOPLE: EXTREMELY DIFFICULT
SUM OF ALL RESPONSES TO PHQ QUESTIONS 1-9: 17
SUM OF ALL RESPONSES TO PHQ QUESTIONS 1-9: 17

## 2023-08-11 ASSESSMENT — PAIN SCALES - GENERAL: PAINLEVEL: NO PAIN (0)

## 2023-08-11 NOTE — PATIENT INSTRUCTIONS
Lets increase the Lexapro to 10 mg which I sent to your pharmacy.  You can take 2 tablets of what you have at home to use it up    I sent Strattera to the pharmacy.  Start with 1 capsule daily.  After a week, you can increase it to 2 capsules.  After 2 weeks you can increase it to 3 capsules.  Just keep me updated as to how things are going in terms of side effects.    If not helpful, we could always get the evaluation to confirm and ADD/ADHD diagnosis.  Contact information is highlighted in your paperwork.  Preventive Health Recommendations  Male Ages 40 to 49    Yearly exam:             See your health care provider every year in order to  o   Review health changes.   o   Discuss preventive care.    o   Review your medicines if your doctor has prescribed any.  You should be tested each year for STDs (sexually transmitted diseases) if you re at risk.   Have a cholesterol test every 5 years.   Have a colonoscopy (test for colon cancer) if someone in your family has had colon cancer or polyps before age 50.   After age 45, have a diabetes test (fasting glucose). If you are at risk for diabetes, you should have this test every 3 years.    Talk with your health care provider about whether or not a prostate cancer screening test (PSA) is right for you.    Shots: Get a flu shot each year. Get a tetanus shot every 10 years.     Nutrition:  Eat at least 5 servings of fruits and vegetables daily.   Eat whole-grain bread, whole-wheat pasta and brown rice instead of white grains and rice.   Get adequate Calcium and Vitamin D.     Lifestyle  Exercise for at least 150 minutes a week (30 minutes a day, 5 days a week). This will help you control your weight and prevent disease.   Limit alcohol to one drink per day.   No smoking.   Wear sunscreen to prevent skin cancer.   See your dentist every six months for an exam and cleaning.

## 2023-08-11 NOTE — PROGRESS NOTES
SUBJECTIVE:   CC: Gael is an 48 year old who presents for preventative health visit.       2023    12:18 PM   Additional Questions   Roomed by Airam Gonzales CMA     Working on eating more greens and less fast food. Getting a bit more exercise in.  Started a new job recently.  Does struggle with some of the anxiety around trying to get the new work done.  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  Sleep is a little more difficult to come by.  Wonders about possible ADD/ADHD.      Healthy Habits:     Getting at least 3 servings of Calcium per day:  Yes    Bi-annual eye exam:  Yes    Dental care twice a year:  Yes    Sleep apnea or symptoms of sleep apnea:  Excessive snoring    Diet:  Regular (no restrictions)    Frequency of exercise:  1 day/week    Duration of exercise:  30-45 minutes    Taking medications regularly:  Yes    Medication side effects:  None    Additional concerns today:  Yes      Today's PHQ-9 Score:       2023    12:16 PM   PHQ-9 SCORE   PHQ-9 Total Score MyChart 17 (Moderately severe depression)   PHQ-9 Total Score 17       Social History     Tobacco Use    Smoking status: Former     Packs/day: 0.00     Types: Cigarettes     Quit date: 2004     Years since quittin.6    Smokeless tobacco: Former   Substance Use Topics    Alcohol use: Not Currently           8/10/2023     1:16 PM   Alcohol Use   Prescreen: >3 drinks/day or >7 drinks/week? Not Applicable       Last PSA: No results found for: PSA    Reviewed orders with patient. Reviewed health maintenance and updated orders accordingly - Yes  Lab work is in process    Reviewed and updated as needed this visit by clinical staff   Tobacco  Allergies  Meds              Reviewed and updated as needed this visit by Provider                 Past Medical History:   Diagnosis Date    Anxiety 2014    Insomnia 2015    Open wound     Created by Conversion  Replacement Utility updated for latest IMO load       Past Surgical History:   Procedure Laterality Date    LUMBAR DISCECTOMY  2009    L5-S1       Review of Systems   Constitutional:  Negative for chills and fever.   HENT:  Negative for congestion, ear pain, hearing loss and sore throat.    Eyes:  Negative for pain and visual disturbance.   Respiratory:  Negative for cough and shortness of breath.    Cardiovascular:  Negative for chest pain, palpitations and peripheral edema.   Gastrointestinal:  Positive for diarrhea and heartburn. Negative for abdominal pain, constipation, hematochezia and nausea.   Genitourinary:  Negative for dysuria, frequency, genital sores, hematuria, impotence, penile discharge and urgency.   Musculoskeletal:  Positive for myalgias. Negative for arthralgias and joint swelling.   Skin:  Negative for rash.   Neurological:  Positive for headaches. Negative for dizziness, weakness and paresthesias.   Psychiatric/Behavioral:  Positive for mood changes. The patient is nervous/anxious.        OBJECTIVE:   /70 (BP Location: Right arm, Patient Position: Sitting, Cuff Size: Adult Regular)   Pulse 51   Temp 98.2  F (36.8  C) (Oral)   Resp 16   Ht 1.829 m (6')   Wt 108.7 kg (239 lb 11.2 oz)   SpO2 97%   BMI 32.51 kg/m      Physical Exam  GENERAL: healthy, alert and no distress  EYES: Eyes grossly normal to inspection, PERRL and conjunctivae and sclerae normal  HENT: ear canals and TM's normal, nose and mouth without ulcers or lesions  NECK: no adenopathy, no asymmetry, masses, or scars and thyroid normal to palpation  RESP: lungs clear to auscultation - no rales, rhonchi or wheezes  CV: regular rate and rhythm, normal S1 S2, no S3 or S4, no murmur, click or rub, no peripheral edema and peripheral pulses strong  ABDOMEN: soft, nontender, no hepatosplenomegaly, no masses and bowel sounds normal  MS: no gross musculoskeletal defects noted, no edema  SKIN: no suspicious lesions or rashes  NEURO: Normal strength and tone, mentation intact and  speech normal  PSYCH: mentation appears normal, affect normal/bright    Diagnostic Test Results:  Labs reviewed in Epic    ASSESSMENT/PLAN:       ICD-10-CM    1. Routine general medical examination at a health care facility  Z00.00       2. Anxiety  F41.9 escitalopram (LEXAPRO) 10 MG tablet      3. Insomnia, unspecified type  G47.00 traZODone (DESYREL) 50 MG tablet      4. Attention and concentration deficit  R41.840 atomoxetine (STRATTERA) 18 MG capsule     Adult Mental Health  Referral        Increase Lexapro dosing to 10 mg.  Referral placed for diagnostic assessment for ADD/ADHD.  Can try Strattera in the meantime.  Reviewed potential side effects.  Declines vaccinations and lab work today.  Colonoscopy up-to-date.    Depression Screening Follow Up        8/11/2023    12:16 PM   PHQ   PHQ-9 Total Score 17   Q9: Thoughts of better off dead/self-harm past 2 weeks Not at all           BMI:   Estimated body mass index is 32.51 kg/m  as calculated from the following:    Height as of this encounter: 1.829 m (6').    Weight as of this encounter: 108.7 kg (239 lb 11.2 oz).   Weight management plan: Discussed healthy diet and exercise guidelines      He reports that he quit smoking about 19 years ago. He has quit using smokeless tobacco.    Alfred Guillaume NP  [facility] submitted by the patient for this visit:  Patient Health Questionnaire (Submitted on 8/11/2023)  If you checked off any problems, how difficult have these problems made it for you to do your work, take care of things at home, or get along with other people?: Extremely difficult  PHQ9 TOTAL SCORE: 17  ERIC-7 (Submitted on 8/11/2023)  ERIC 7 TOTAL SCORE: 15

## 2023-08-21 ENCOUNTER — MYC MEDICAL ADVICE (OUTPATIENT)
Dept: FAMILY MEDICINE | Facility: CLINIC | Age: 48
End: 2023-08-21
Payer: COMMERCIAL

## 2023-08-21 DIAGNOSIS — R41.840 ATTENTION AND CONCENTRATION DEFICIT: Primary | ICD-10-CM

## 2023-08-22 RX ORDER — BUPROPION HYDROCHLORIDE 150 MG/1
150 TABLET ORAL EVERY MORNING
Qty: 30 TABLET | Refills: 0 | Status: SHIPPED | OUTPATIENT
Start: 2023-08-22 | End: 2023-09-18

## 2023-09-16 ENCOUNTER — MYC MEDICAL ADVICE (OUTPATIENT)
Dept: FAMILY MEDICINE | Facility: CLINIC | Age: 48
End: 2023-09-16
Payer: COMMERCIAL

## 2023-09-18 DIAGNOSIS — R41.840 ATTENTION AND CONCENTRATION DEFICIT: ICD-10-CM

## 2023-09-18 RX ORDER — BUPROPION HYDROCHLORIDE 150 MG/1
150 TABLET ORAL EVERY MORNING
Qty: 30 TABLET | Refills: 0 | Status: SHIPPED | OUTPATIENT
Start: 2023-09-18 | End: 2023-10-17

## 2023-10-09 DIAGNOSIS — G47.00 INSOMNIA, UNSPECIFIED TYPE: ICD-10-CM

## 2023-10-10 RX ORDER — TRAZODONE HYDROCHLORIDE 50 MG/1
100 TABLET, FILM COATED ORAL AT BEDTIME
Qty: 180 TABLET | Refills: 3 | OUTPATIENT
Start: 2023-10-10

## 2023-10-16 DIAGNOSIS — R41.840 ATTENTION AND CONCENTRATION DEFICIT: ICD-10-CM

## 2023-10-17 RX ORDER — BUPROPION HYDROCHLORIDE 150 MG/1
150 TABLET ORAL EVERY MORNING
Qty: 30 TABLET | Refills: 0 | Status: SHIPPED | OUTPATIENT
Start: 2023-10-17 | End: 2023-11-16

## 2023-11-07 DIAGNOSIS — R41.840 ATTENTION AND CONCENTRATION DEFICIT: ICD-10-CM

## 2023-11-07 DIAGNOSIS — F41.9 ANXIETY: ICD-10-CM

## 2023-11-07 RX ORDER — ESCITALOPRAM OXALATE 10 MG/1
10 TABLET ORAL DAILY
Qty: 90 TABLET | Refills: 2 | Status: SHIPPED | OUTPATIENT
Start: 2023-11-07

## 2023-11-08 RX ORDER — ATOMOXETINE 18 MG/1
CAPSULE ORAL
Qty: 90 CAPSULE | Refills: 0 | Status: SHIPPED | OUTPATIENT
Start: 2023-11-08

## 2023-11-16 DIAGNOSIS — R41.840 ATTENTION AND CONCENTRATION DEFICIT: ICD-10-CM

## 2023-11-16 RX ORDER — BUPROPION HYDROCHLORIDE 150 MG/1
150 TABLET ORAL EVERY MORNING
Qty: 90 TABLET | Refills: 2 | Status: SHIPPED | OUTPATIENT
Start: 2023-11-16

## 2024-05-04 DIAGNOSIS — F41.9 ANXIETY: ICD-10-CM

## 2024-05-06 RX ORDER — ESCITALOPRAM OXALATE 10 MG/1
10 TABLET ORAL DAILY
Qty: 90 TABLET | Refills: 2 | OUTPATIENT
Start: 2024-05-06

## 2024-07-12 ENCOUNTER — PATIENT OUTREACH (OUTPATIENT)
Dept: CARE COORDINATION | Facility: CLINIC | Age: 49
End: 2024-07-12
Payer: COMMERCIAL

## 2024-07-26 ENCOUNTER — PATIENT OUTREACH (OUTPATIENT)
Dept: CARE COORDINATION | Facility: CLINIC | Age: 49
End: 2024-07-26
Payer: COMMERCIAL

## 2024-09-21 DIAGNOSIS — G47.00 INSOMNIA, UNSPECIFIED TYPE: ICD-10-CM

## 2024-09-24 NOTE — TELEPHONE ENCOUNTER
Please contact patient.  Attempts made the summer to schedule annual checkup went unanswered.  Once scheduled I can bridge.    Alfred Guillaume NP

## 2024-09-25 RX ORDER — TRAZODONE HYDROCHLORIDE 50 MG/1
100 TABLET, FILM COATED ORAL AT BEDTIME
Qty: 180 TABLET | Refills: 0 | OUTPATIENT
Start: 2024-09-25

## 2024-10-05 ENCOUNTER — HEALTH MAINTENANCE LETTER (OUTPATIENT)
Age: 49
End: 2024-10-05